# Patient Record
Sex: FEMALE | Race: AMERICAN INDIAN OR ALASKA NATIVE
[De-identification: names, ages, dates, MRNs, and addresses within clinical notes are randomized per-mention and may not be internally consistent; named-entity substitution may affect disease eponyms.]

---

## 2018-03-20 ENCOUNTER — HOSPITAL ENCOUNTER (OUTPATIENT)
Dept: HOSPITAL 5 - US | Age: 33
Discharge: HOME | End: 2018-03-20
Attending: OBSTETRICS & GYNECOLOGY
Payer: MEDICARE

## 2018-03-20 DIAGNOSIS — D25.0: ICD-10-CM

## 2018-03-20 DIAGNOSIS — D25.1: Primary | ICD-10-CM

## 2018-03-20 PROCEDURE — 76856 US EXAM PELVIC COMPLETE: CPT

## 2018-03-20 PROCEDURE — 76830 TRANSVAGINAL US NON-OB: CPT

## 2018-03-20 NOTE — ULTRASOUND REPORT
ULTRASOUND PELVIC COMPLETE

ULTRASOUND TRANSVAGINAL



HISTORY: Pelvic pain.



COMPARISON:  None.



TECHNIQUE: Transabdominal and transvaginal ultrasound with color 

doppler interrogation.



FINDINGS:



Uterus: The uterus is anteverted. The uterus measures 9.1 x 4.4 x 5.8 

cm. At least 3 fibroids are identified. The largest fibroid is 

submucosal in location in the posterior wall measuring 2.6 x 2.0 cm. An 

intramural fibroid in the anterior wall measures 1.4 x 1.1 cm. An 

intramural fibroid at the uterine fundus measures 1.8 x 1.4 cm. The 

cervix is unremarkable.



Endometrium: 7.6 mm. There is mild anterior displacement body posterior 

wall fibroid.



Right ovary: 4.0 x 1.9 x 2.6 cm. No focal abnormality.



Left ovary: 3.3 x 1.4 x 2.2 cm. No focal abnormality.



No pelvic fluid or mass is identified. Normal color doppler 

interrogation.







IMPRESSION:

Uterine fibroid disease as described above.

## 2018-04-24 ENCOUNTER — HOSPITAL ENCOUNTER (OUTPATIENT)
Dept: HOSPITAL 5 - OR | Age: 33
Discharge: HOME | End: 2018-04-24
Attending: OBSTETRICS & GYNECOLOGY
Payer: MEDICARE

## 2018-04-24 VITALS — DIASTOLIC BLOOD PRESSURE: 79 MMHG | SYSTOLIC BLOOD PRESSURE: 115 MMHG

## 2018-04-24 DIAGNOSIS — D25.9: Primary | ICD-10-CM

## 2018-04-24 DIAGNOSIS — N88.2: ICD-10-CM

## 2018-04-24 DIAGNOSIS — J45.909: ICD-10-CM

## 2018-04-24 DIAGNOSIS — F17.200: ICD-10-CM

## 2018-04-24 DIAGNOSIS — I10: ICD-10-CM

## 2018-04-24 DIAGNOSIS — F17.210: ICD-10-CM

## 2018-04-24 LAB
HCT VFR BLD CALC: 39.1 % (ref 30.3–42.9)
HGB BLD-MCNC: 12.7 GM/DL (ref 10.1–14.3)

## 2018-04-24 PROCEDURE — 85018 HEMOGLOBIN: CPT

## 2018-04-24 PROCEDURE — 88305 TISSUE EXAM BY PATHOLOGIST: CPT

## 2018-04-24 PROCEDURE — 81025 URINE PREGNANCY TEST: CPT

## 2018-04-24 PROCEDURE — 58558 HYSTEROSCOPY BIOPSY: CPT

## 2018-04-24 PROCEDURE — 36415 COLL VENOUS BLD VENIPUNCTURE: CPT

## 2018-04-24 PROCEDURE — 85014 HEMATOCRIT: CPT

## 2018-04-24 PROCEDURE — A4217 STERILE WATER/SALINE, 500 ML: HCPCS

## 2018-04-24 NOTE — ANESTHESIA CONSULTATION
Anesthesia Consult and Med Hx


Date of service: 04/24/18





- Airway


Anesthetic Teeth Evaluation: Good


ROM Head & Neck: Adequate


Mallampati Class: Class I





- Pulmonary Exam


CTA: Yes





- Cardiac Exam


Cardiac Exam: RRR





- Pre-Operative Health Status


ASA Pre-Surgery Classification: ASA2


Proposed Anesthetic Plan: General





- Pre-Anesthesia Comment


Pre-Anesthesia Comments: patient smokes 1/2 ppd.  drinks one bottle of wine 

daily.  CHF- no limitations.  takes metoprolol.  Asthma: mild.  last used 

albuterol inhaler 3 months ago





- Pulmonary


Hx Smoking: Yes


Hx Asthma: Yes (last treated 9-10 mos ago)





- Cardiovascular System


Hx Hypertension: Yes (intermittant)





- Central Nervous System


Hx Psychiatric Problems: No





- Other Systems


Hx Alcohol Use: Yes (occas)


Hx Cancer: No

## 2018-04-24 NOTE — OPERATIVE REPORT
Operative Report


Operative Report: 





PREOPERATIVE DIAGNOSIS: 1.  Menorrhagia   2.  Uterine fibroids   3.  Cervical 

stenosis





POSTOPERATIVE DIAGNOSIS: Same





OPERATIVE PROCEDURE:  1.  Hysteroscopy   2.  Dilatation and curettage.





SURGEON:  Gerber Becerra MD





ANESTHESIA: Gen. mask





ANESTHESIOLOGIST: Dr. Brock





ESTIMATED BLOOD LOSS: Less than 10 mL's





FINDINGS: A 10-12 week size uterus with stenotic cervix.  Large amount of lush 

endometrial tissue obtained with D&C.





COMPLICATIONS: None





COUNTS:  Correct x3.





PROCEDURE:  After the patient was correctly identified, and after general 

anesthesia was administered, the patient was prepped and draped in the usual 

sterile fashion and placed in dorsal lithotomy position.  First, the bladder 

was emptied using a straight catheter.  Next, a speculum was placed in the 

vaginal vault and the anterior lip of the cervix was grasped using a single-

tooth tenaculum.  The cervix was stenotic, and the uterus was sounded to 12 cm.

  The cervical os was sequentially dilated, and the hysteroscope was introduced 

into the cervical canal.  Visualization of endometrial cavity found lush amount 

of endometrial tissue,with possible endometrial polyps.  The hysteroscope was 

then removed, sharp curettage was used obtain lush amounts of endometrial 

tissue which was sent to pathology.  After all the endometrial tissue were 

removed, the procedure was considered complete.  All instruments were removed 

from the vagina.  The patient tolerated the procedure well and was transferred 

to the recovery room in stable condition.

## 2018-04-24 NOTE — SHORT STAY SUMMARY
Short Stay Documentation


Date of service: 18


Narrative H&P: 





Pt is a 31yo BF  LMP 17 presents for a Hysteroscopy and D&C.  She was 

unable to tolerate an endometrial biopsy in the office due to cervical stenosis 

and uterine fibroids with thickened endometrium.





- History


Principal diagnosis: Uterine fibroids


H&P: obtained from office


Past Medical History: hypertension, other (Asthma, CHF)


Past Surgical History: , hernia repair, tonsillectomy


Social history: no significant social history, single





- Allergies and Medications


Current Medications: 


 Allergies





amoxicillin [Amoxicillin] Allergy (Verified 18 11:06)


 Rash


codeine Allergy (Verified 18 11:06)


 Angioedema


latex Allergy (Verified 18 11:06)


 Rash





 Home Medications











 Medication  Instructions  Recorded  Confirmed  Last Taken  Type


 


ALBUTEROL Inhaler [Proair] 2 puff IH QID PRN 18 09:00 

History


 


Metoprolol SUCCINATE ER TAB 25 mg PO BID 18 19:00 

History








Active Medications





Gentamicin Sulfate (Garamycin)  120 mg 1.5 mg/kg (120 mg) IV PREOP ALECIA; Protocol


Lactated Ringer's (Lactated Ringers)  1,000 mls @ 100 mls/hr IV AS DIRECT ALECIA


Clindamycin HCl (Cleocin 600 Mg/50 Ml)  600 mg in 50 mls @ 100 mls/hr IV PREOP 

NR; Protocol











- Physical exam


General appearance: no acute distress


Integumentary: no rash


HEENT: Atraumatic


Lungs: Clear to auscultation


Breasts: deferred


Heart: Regular rate


Gastrointestinal: normal


Female Genitourinary: deferred


Extremities: no ischemia, No edema


Neurological: Normal gait, Normal speech





- Brief post op/procedure progress note


Date of procedure: 18


Pre-op diagnosis: 1. Menorrhagia  2. Uterine fibroids  3. Cervical stenosis


Post-op diagnosis: same


Procedure: 





1.  Hysteroscopy  2.  D&C


Anesthesia: MAC


Findings: 





An enlarged uterus with stenotic cervix. Large amounts of lush endometrial 

tissue obtained with D&C.


Surgeon: GLORIA AGUILAR


Estimated blood loss: minimal


Pathology: list (endometrial curettings)


Specimen disposition: to lab


Condition: stable





- Hospital course


Hospital course: 





Unremarkable.





- Disposition


Condition at discharge: Good


Disposition: DC-01 TO HOME OR SELFCARE





- Discharge Diagnoses


(1) Menorrhagia


Status: Chronic   





(2) Uterine fibroid


Status: Chronic   


Qualifiers: 


   Uterine leiomyoma location: intramural and submucous   Qualified Code(s): 

D25.1 - Intramural leiomyoma of uterus; D25.0 - Submucous leiomyoma of uterus   





(3) Cervical stenosis (uterine cervix)


Status: Resolved   





Short Stay Discharge Plan


Activity: no restrictions


Diet: regular


Follow up with: 


JOSELIN AGUILAR MD [Primary Care Provider] - 7 Days


GLORIA AGUILAR MD [Staff Physician] - 14 Days


Prescriptions: 


Ibuprofen [Motrin] 800 mg PO Q8HR PRN #30 tablet


 PRN Reason: Pain, Moderate (4-6)

## 2018-04-29 ENCOUNTER — HOSPITAL ENCOUNTER (EMERGENCY)
Dept: HOSPITAL 5 - ED | Age: 33
LOS: 1 days | Discharge: HOME | End: 2018-04-30
Payer: MEDICARE

## 2018-04-29 DIAGNOSIS — Z88.5: ICD-10-CM

## 2018-04-29 DIAGNOSIS — I10: ICD-10-CM

## 2018-04-29 DIAGNOSIS — D25.1: ICD-10-CM

## 2018-04-29 DIAGNOSIS — I11.0: ICD-10-CM

## 2018-04-29 DIAGNOSIS — K21.9: ICD-10-CM

## 2018-04-29 DIAGNOSIS — F17.200: ICD-10-CM

## 2018-04-29 DIAGNOSIS — I50.9: ICD-10-CM

## 2018-04-29 DIAGNOSIS — J45.909: ICD-10-CM

## 2018-04-29 DIAGNOSIS — R10.32: ICD-10-CM

## 2018-04-29 DIAGNOSIS — Z90.49: ICD-10-CM

## 2018-04-29 DIAGNOSIS — N99.820: Primary | ICD-10-CM

## 2018-04-29 DIAGNOSIS — Z98.890: ICD-10-CM

## 2018-04-29 DIAGNOSIS — Z88.1: ICD-10-CM

## 2018-04-29 DIAGNOSIS — R07.89: ICD-10-CM

## 2018-04-29 DIAGNOSIS — Z91.040: ICD-10-CM

## 2018-04-29 LAB
ALBUMIN SERPL-MCNC: 4.2 G/DL (ref 3.9–5)
ALT SERPL-CCNC: 13 UNITS/L (ref 7–56)
APTT BLD: 30.7 SEC. (ref 24.2–36.6)
BACTERIA #/AREA URNS HPF: (no result) /HPF
BASOPHILS # (AUTO): 0 K/MM3 (ref 0–0.1)
BASOPHILS NFR BLD AUTO: 0.6 % (ref 0–1.8)
BILIRUB UR QL STRIP: (no result)
BLOOD UR QL VISUAL: (no result)
BUN SERPL-MCNC: 7 MG/DL (ref 7–17)
BUN/CREAT SERPL: 14 %
CALCIUM SERPL-MCNC: 9.2 MG/DL (ref 8.4–10.2)
EOSINOPHIL # BLD AUTO: 0.1 K/MM3 (ref 0–0.4)
EOSINOPHIL NFR BLD AUTO: 0.9 % (ref 0–4.3)
HCT VFR BLD CALC: 43 % (ref 30.3–42.9)
HEMOLYSIS INDEX: 4
HGB BLD-MCNC: 13.9 GM/DL (ref 10.1–14.3)
INR PPP: 0.93 (ref 0.87–1.13)
LIPASE SERPL-CCNC: 17 UNITS/L (ref 13–60)
LYMPHOCYTES # BLD AUTO: 2.7 K/MM3 (ref 1.2–5.4)
LYMPHOCYTES NFR BLD AUTO: 47.9 % (ref 13.4–35)
MCH RBC QN AUTO: 26 PG (ref 28–32)
MCHC RBC AUTO-ENTMCNC: 32 % (ref 30–34)
MCV RBC AUTO: 80 FL (ref 79–97)
MONOCYTES # (AUTO): 0.5 K/MM3 (ref 0–0.8)
MONOCYTES % (AUTO): 8.5 % (ref 0–7.3)
MUCOUS THREADS #/AREA URNS HPF: (no result) /HPF
PH UR STRIP: 7 [PH] (ref 5–7)
PLATELET # BLD: 304 K/MM3 (ref 140–440)
PROT UR STRIP-MCNC: (no result) MG/DL
RBC # BLD AUTO: 5.39 M/MM3 (ref 3.65–5.03)
RBC #/AREA URNS HPF: 3 /HPF (ref 0–6)
UROBILINOGEN UR-MCNC: < 2 MG/DL (ref ?–2)
WBC #/AREA URNS HPF: < 1 /HPF (ref 0–6)

## 2018-04-29 PROCEDURE — 83690 ASSAY OF LIPASE: CPT

## 2018-04-29 PROCEDURE — 86900 BLOOD TYPING SEROLOGIC ABO: CPT

## 2018-04-29 PROCEDURE — 93005 ELECTROCARDIOGRAM TRACING: CPT

## 2018-04-29 PROCEDURE — 86901 BLOOD TYPING SEROLOGIC RH(D): CPT

## 2018-04-29 PROCEDURE — 80053 COMPREHEN METABOLIC PANEL: CPT

## 2018-04-29 PROCEDURE — 85730 THROMBOPLASTIN TIME PARTIAL: CPT

## 2018-04-29 PROCEDURE — 96361 HYDRATE IV INFUSION ADD-ON: CPT

## 2018-04-29 PROCEDURE — 96375 TX/PRO/DX INJ NEW DRUG ADDON: CPT

## 2018-04-29 PROCEDURE — 86850 RBC ANTIBODY SCREEN: CPT

## 2018-04-29 PROCEDURE — 93010 ELECTROCARDIOGRAM REPORT: CPT

## 2018-04-29 PROCEDURE — 96374 THER/PROPH/DIAG INJ IV PUSH: CPT

## 2018-04-29 PROCEDURE — 96376 TX/PRO/DX INJ SAME DRUG ADON: CPT

## 2018-04-29 PROCEDURE — 85025 COMPLETE CBC W/AUTO DIFF WBC: CPT

## 2018-04-29 PROCEDURE — 76830 TRANSVAGINAL US NON-OB: CPT

## 2018-04-29 PROCEDURE — 81001 URINALYSIS AUTO W/SCOPE: CPT

## 2018-04-29 PROCEDURE — 74177 CT ABD & PELVIS W/CONTRAST: CPT

## 2018-04-29 PROCEDURE — 99284 EMERGENCY DEPT VISIT MOD MDM: CPT

## 2018-04-29 PROCEDURE — 76856 US EXAM PELVIC COMPLETE: CPT

## 2018-04-29 PROCEDURE — 36415 COLL VENOUS BLD VENIPUNCTURE: CPT

## 2018-04-29 PROCEDURE — 85610 PROTHROMBIN TIME: CPT

## 2018-04-29 RX ADMIN — MORPHINE SULFATE PRN MG: 2 INJECTION, SOLUTION INTRAMUSCULAR; INTRAVENOUS at 19:28

## 2018-04-29 RX ADMIN — MORPHINE SULFATE PRN MG: 2 INJECTION, SOLUTION INTRAMUSCULAR; INTRAVENOUS at 20:26

## 2018-04-29 RX ADMIN — MORPHINE SULFATE PRN MG: 2 INJECTION, SOLUTION INTRAMUSCULAR; INTRAVENOUS at 21:23

## 2018-04-29 RX ADMIN — MORPHINE SULFATE PRN MG: 2 INJECTION, SOLUTION INTRAMUSCULAR; INTRAVENOUS at 18:43

## 2018-04-29 RX ADMIN — MORPHINE SULFATE PRN MG: 2 INJECTION, SOLUTION INTRAMUSCULAR; INTRAVENOUS at 18:01

## 2018-04-29 NOTE — ULTRASOUND REPORT
FINAL REPORT



EXAM:  US TRANSVAGINAL



HISTORY:  abd pain, s/p hysteroscopy 



TECHNIQUE:  Transabdominal and transvaginal sonography of the

pelvis.



PRIORS:  None.



FINDINGS:  

The uterus measures 9.3 x 4.5 x 5.9 cm, has heterogeneous

echotexture and contains hypoechoic fibroids x3, largest noted in

posterior corpus region measuring 3.5 x 2.7 x 3.4 cm. Next

largest fibroid noted in fundal region measuring 1.8 x 1.1 x 1.5

cm. Additional fibroid noted in anterior corpus region measuring

1.3 x 1.1 x 2.1 cm. Small hyperechoic focus and possible

calcification noted in mid posterior corpus subjacent to

endometrium. The endometrial stripe is within normal limits and

measures 6 mm in AP dimension.



The right ovary measures 3.7 x 2.1 x 2.3 cm and is grossly

unremarkable. The left ovary measures 4.0 x 2.2 x 1.8 cm,

demonstrates probable follicular change measuring 2 cm and is

grossly unremarkable. Duplex Doppler shows appropriate blood flow

present in the ovaries bilaterally. No adnexal masses or

significant free peritoneal fluid.



IMPRESSION:  

1. Leiomyomatous change in the uterus. 



2. Findings compatible with follicular change in the left ovary. 



3. Otherwise, unremarkable.

## 2018-04-29 NOTE — CAT SCAN REPORT
FINAL REPORT



EXAM:  CT ABDOMEN PELVIS W CON



HISTORY:  LLQ pain, recent hysteroscopy, stable pelvic US 



TECHNIQUE:  Spiral CT scanning of the abdomen and pelvis after

the uneventful administration of IV contrast. Multiplanar

reformations. 100 mL Omnipaque IV.



PRIORS:  None.



FINDINGS:  

Abdomen: 



Visualized lung bases show mild, dependent atelectasis

bilaterally.  



No radiopaque gallstones. 



Liver without significant abnormality. 



Spleen without significant abnormality. 



Pancreas without significant abnormality. 



Kidneys without significant abnormality. 



Adrenal glands without significant abnormality. 



Pelvis: 



Multiple loops of prominent small bowel and colon containing gas

and fluid without discrete transition point. Mild diverticular

change in the proximal descending colon. 



Appendix within normal limits. 



Trace amount of nonspecific, free fluid in the pelvis. No

discrete abscess. 



Abdominal aorta non-aneurysmal. 



Somewhat lobular uterine margins suggesting leiomyomatous change.

Ovoid, cystic focus in left ovary measuring approximately 2 cm.



IMPRESSION:  

1. Nonspecific bowel gas pattern suggesting adynamic ileus, which

may be associated with nonspecific postinflammatory change,

including diarrhea or enteritis. Correlate clinically. 



2. Possible physiologic cystic change in the left ovary, and

sequelae of ovarian follicle or cyst rupture.

## 2018-04-29 NOTE — EMERGENCY DEPARTMENT REPORT
ED General Adult HPI





- General


Chief complaint: Pain General


Stated complaint: LEFT GROIN SWELLING S/P D&C


Time Seen by Provider: 18 16:32


Source: patient


Mode of arrival: Ambulatory


Limitations: No Limitations





- History of Present Illness


Initial comments: 





32-year-old woman is one-week status post hysteroscopy for resection of uterine 

leiomyomata, and presents today with history of mild recurrent bleeding, but 

significant abdominal pain, which has been progressive since a couple of days 

after surgery, worse in the left lower quadrant.  She has not had any fever or 

chills or diaphoresis, but pain and distention is felt throughout the abdomen, 

and she is having some secondary discomfort in her chest, which is worse with 

breathing, which feels somewhat restricted.





Patient's bleeding initially stopped for a day or so after the initial procedure

, but then again recurring, at about a rate of a pad per day, and has increased 

about 2 pads yesterday.  She has not had any purulence, she has no difficulty 

urinating, and again no fever chills or diaphoresis.


Onset/Timin


-: days(s), week(s)


Location: abdomen, pelvis


Radiation: abdomen


Quality: aching, constant


Consistency: constant


Improves with: other (intermittent partial relief with NSAIDs)


Worsens with: movement


Associated Symptoms: chest pain


Treatments Prior to Arrival: NSAID





- Related Data


 Home Medications











 Medication  Instructions  Recorded  Confirmed  Last Taken


 


ALBUTEROL Inhaler [Proair] 2 puff IH QID PRN 18 09:00


 


Metoprolol SUCCINATE ER TAB 25 mg PO BID 18 19:00








 Previous Rx's











 Medication  Instructions  Recorded  Last Taken  Type


 


Ibuprofen [Motrin] 800 mg PO Q8HR PRN #30 tablet 18 Unknown Rx


 


Ondansetron [Zofran ODT TAB] 8 mg PO Q8HR #10 tab.rapdis 18 Unknown Rx


 


Oxycodone HCl/Acetaminophen 1 each PO Q6HR PRN #20 tablet 18 Unknown Rx





[Percocet 10/325 mg]    











 Allergies











Allergy/AdvReac Type Severity Reaction Status Date / Time


 


amoxicillin [Amoxicillin] Allergy  Rash Verified 18 11:06


 


codeine Allergy  Angioedema Verified 18 11:06


 


latex Allergy  Rash Verified 18 11:06














ED Review of Systems


ROS: 


Stated complaint: LEFT GROIN SWELLING S/P D&C


Other details as noted in HPI





Constitutional: denies: chills, fever


Eyes: denies: eye pain, eye discharge, vision change


ENT: denies: ear pain, throat pain


Respiratory: denies: cough, shortness of breath


Cardiovascular: chest pain (sharp, anterior, costochondral)


Endocrine: no symptoms reported


Gastrointestinal: abdominal pain, nausea.  denies: constipation


Genitourinary: other (vaginal bleeding, one to 2 pads per day).  denies: urgency

, dysuria


Musculoskeletal: back pain


Skin: denies: rash, lesions


Neurological: denies: headache, weakness, paresthesias


Psychiatric: denies: anxiety, depression





ED Past Medical Hx





- Past Medical History


Previous Medical History?: Yes


Hx Hypertension: Yes (intermittant)


Hx Congestive Heart Failure: Yes


Hx GERD: Yes


Hx Asthma: Yes (last treated 9-10 mos ago)


Additional medical history: cardiomyopathy





- Surgical History


Past Surgical History?: Yes


Hx Appendectomy: Yes


Additional Surgical History: c section,  abd hernia repair





- Social History


Smoking Status: Current Every Day Smoker


Substance Use Type: Alcohol, Prescribed





- Medications


Home Medications: 


 Home Medications











 Medication  Instructions  Recorded  Confirmed  Last Taken  Type


 


ALBUTEROL Inhaler [Proair] 2 puff IH QID PRN 18 09:00 

History


 


Ibuprofen [Motrin] 800 mg PO Q8HR PRN #30 tablet 18  Unknown Rx


 


Metoprolol SUCCINATE ER TAB 25 mg PO BID 18 19:00 

History


 


Ondansetron [Zofran ODT TAB] 8 mg PO Q8HR #10 tab.rapdis 18  Unknown Rx


 


Oxycodone HCl/Acetaminophen 1 each PO Q6HR PRN #20 tablet 18  Unknown Rx





[Percocet 10/325 mg]     














ED Physical Exam





- General


Limitations: No Limitations


General appearance: alert, in distress





- Eye


Eye exam: Present: normal appearance, PERRL, EOMI





- ENT


ENT exam: Present: normal exam





- Neck


Neck exam: Present: normal inspection





- Respiratory


Respiratory exam: Present: chest wall tenderness (costochondral tenderness with 

secondary pectoralis muscle, left greater than right)





- Cardiovascular


Cardiovascular Exam: Present: tachycardia





- GI/Abdominal


GI/Abdominal exam: Present: distended (moderate), tenderness (moderate), 

guarding (moderate).  Absent: rebound





- Rectal


Rectal exam: Present: deferred





- Extremities Exam


Extremities exam: Present: normal inspection





- Back Exam


Back exam: Present: tenderness (minimal lumbosacral)





- Neurological Exam


Neurological exam: Present: alert, oriented X3





- Psychiatric


Psychiatric exam: Present: normal affect, normal mood





ED Course


 Vital Signs











  18





  15:56 16:44 16:53


 


Temperature 98.2 F 98.4 F 


 


Pulse Rate 143 H  114 H


 


Respiratory 22 20 10 L





Rate   


 


Blood Pressure 152/92  132/85


 


O2 Sat by Pulse 100 99 100





Oximetry   














  18





  16:55 16:57 16:59


 


Temperature   


 


Pulse Rate 115 H 115 H 114 H


 


Respiratory 15 25 H 18





Rate   


 


Blood Pressure 132/85 132/85 132/85


 


O2 Sat by Pulse 99 99 99





Oximetry   














  18





  17:01 17:02 17:27


 


Temperature   


 


Pulse Rate 112 H  101 H


 


Respiratory 20  18





Rate   


 


Blood Pressure 109/70 109/70 109/70


 


O2 Sat by Pulse 98 98 98





Oximetry   














  18





  17:29 17:30 17:33


 


Temperature   


 


Pulse Rate 103 H 102 H 100 H


 


Respiratory 18 12 14





Rate   


 


Blood Pressure 109/70 142/94 142/94


 


O2 Sat by Pulse 98 98 100





Oximetry   














  18





  17:35 17:37 17:39


 


Temperature   


 


Pulse Rate 107 H 109 H 100 H


 


Respiratory 20 26 H 19





Rate   


 


Blood Pressure 142/94 142/94 142/94


 


O2 Sat by Pulse 100 99 99





Oximetry   














  18





  17:41 17:43 17:45


 


Temperature   


 


Pulse Rate 100 H 101 H 102 H


 


Respiratory 19 21 16





Rate   


 


Blood Pressure 142/94 142/94 142/94


 


O2 Sat by Pulse 99 99 99





Oximetry   














  18





  17:46 17:49 17:51


 


Temperature   


 


Pulse Rate 106 H 104 H 102 H


 


Respiratory 13 17 16





Rate   


 


Blood Pressure 141/75 141/75 141/75


 


O2 Sat by Pulse 99 99 100





Oximetry   














  18





  17:53 17:54 18:25


 


Temperature   


 


Pulse Rate 103 H 105 H 92 H


 


Respiratory 16 13 





Rate   


 


Blood Pressure 141/75 141/75 141/75


 


O2 Sat by Pulse 100 99 





Oximetry   














  18





  18:27 18:29 18:30


 


Temperature   


 


Pulse Rate 89 95 H 95 H


 


Respiratory 22 25 H 24





Rate   


 


Blood Pressure 141/75 141/75 138/83


 


O2 Sat by Pulse 99 98 99





Oximetry   














  18





  18:33 18:35 18:37


 


Temperature   


 


Pulse Rate 94 H 98 H 96 H


 


Respiratory 24 18 17





Rate   


 


Blood Pressure 138/83 138/83 138/83


 


O2 Sat by Pulse 99 99 98





Oximetry   














  18





  18:39 18:41 18:43


 


Temperature   


 


Pulse Rate 94 H 95 H 103 H


 


Respiratory 11 L 14 12





Rate   


 


Blood Pressure 138/83 138/83 138/83


 


O2 Sat by Pulse 97 98 98





Oximetry   














  18





  18:45 18:47 18:49


 


Temperature   


 


Pulse Rate   


 


Respiratory 13 24 18





Rate   


 


Blood Pressure 126/68 126/68 126/68


 


O2 Sat by Pulse 98 97 98





Oximetry   














  18





  18:51 18:53 18:55


 


Temperature   


 


Pulse Rate   


 


Respiratory 13 12 22





Rate   


 


Blood Pressure 126/68 126/68 126/68


 


O2 Sat by Pulse 98 99 96





Oximetry   














  18





  18:57 18:59 19:00


 


Temperature   


 


Pulse Rate   


 


Respiratory 23 21 17





Rate   


 


Blood Pressure 126/68 126/68 125/78


 


O2 Sat by Pulse 97 96 98





Oximetry   














  18





  19:03 19:05 19:07


 


Temperature   


 


Pulse Rate   


 


Respiratory 20 18 13





Rate   


 


Blood Pressure 125/78 125/78 125/78


 


O2 Sat by Pulse 98 98 100





Oximetry   














  18





  19:09 19:11 19:13


 


Temperature   


 


Pulse Rate   


 


Respiratory 20 14 11 L





Rate   


 


Blood Pressure 125/78 125/78 125/78


 


O2 Sat by Pulse 98 97 99





Oximetry   














  18





  19:15 19:17 19:19


 


Temperature   


 


Pulse Rate   


 


Respiratory 14 17 13





Rate   


 


Blood Pressure 134/81 134/81 134/81


 


O2 Sat by Pulse 100 99 99





Oximetry   














  18





  19:21 19:25 19:30


 


Temperature   


 


Pulse Rate   


 


Respiratory 18 9 L 15





Rate   


 


Blood Pressure 134/81 134/81 132/82


 


O2 Sat by Pulse 98 96 100





Oximetry   














  18





  19:35 19:41 19:45


 


Temperature   


 


Pulse Rate   


 


Respiratory 10 L 15 21





Rate   


 


Blood Pressure 132/82 132/82 146/95


 


O2 Sat by Pulse 99 100 99





Oximetry   














  18





  19:51 19:55 20:00


 


Temperature   


 


Pulse Rate   


 


Respiratory 12 12 17





Rate   


 


Blood Pressure 146/95 146/95 151/96


 


O2 Sat by Pulse 100 100 98





Oximetry   














  18





  20:05 20:11 20:15


 


Temperature   


 


Pulse Rate   


 


Respiratory 19 17 13





Rate   


 


Blood Pressure 151/96 151/96 154/101


 


O2 Sat by Pulse 100 99 100





Oximetry   














  18





  20:21 20:25 20:30


 


Temperature   


 


Pulse Rate   


 


Respiratory 10 L 16 17





Rate   


 


Blood Pressure 154/101 154/101 144/102


 


O2 Sat by Pulse 99 99 99





Oximetry   














  18





  20:35 20:41 20:45


 


Temperature   


 


Pulse Rate   


 


Respiratory 13 16 14





Rate   


 


Blood Pressure 144/102 144/102 145/93


 


O2 Sat by Pulse 99 98 100





Oximetry   














  18





  20:51 21:07 21:11


 


Temperature   


 


Pulse Rate   


 


Respiratory 12 10 L 18





Rate   


 


Blood Pressure 145/93 145/93 145/93


 


O2 Sat by Pulse 99 100 98





Oximetry   














  18





  21:15 21:21 21:25


 


Temperature   


 


Pulse Rate   


 


Respiratory 12 18 15





Rate   


 


Blood Pressure 157/88 157/88 157/88


 


O2 Sat by Pulse 100 96 97





Oximetry   














  18





  21:31 21:35 21:41


 


Temperature   


 


Pulse Rate   


 


Respiratory 13 8 L 18





Rate   


 


Blood Pressure 130/72 130/72 130/72


 


O2 Sat by Pulse 99 100 98





Oximetry   














  18





  21:45 21:51 21:55


 


Temperature   


 


Pulse Rate   


 


Respiratory 9 L 17 15





Rate   


 


Blood Pressure 140/71 140/71 140/71


 


O2 Sat by Pulse 99 97 96





Oximetry   














  18





  22:00 22:05 22:11


 


Temperature   


 


Pulse Rate   


 


Respiratory 18 13 16





Rate   


 


Blood Pressure 133/76 133/76 133/76


 


O2 Sat by Pulse 98 99 99





Oximetry   














  18





  22:15 22:21 22:25


 


Temperature   


 


Pulse Rate   


 


Respiratory 24 20 10 L





Rate   


 


Blood Pressure 133/76 133/76 133/76


 


O2 Sat by Pulse 97 99 97





Oximetry   














  18





  22:31 22:35 22:41


 


Temperature   


 


Pulse Rate   


 


Respiratory 15 12 16





Rate   


 


Blood Pressure 133/76 133/76 133/76


 


O2 Sat by Pulse 97 99 95





Oximetry   














  18





  22:45 22:51 22:55


 


Temperature   


 


Pulse Rate   


 


Respiratory 14 16 12





Rate   


 


Blood Pressure 118/82 118/82 118/82


 


O2 Sat by Pulse 98 98 95





Oximetry   














  18





  23:00 23:05 23:09


 


Temperature   


 


Pulse Rate   


 


Respiratory 13 17 11 L





Rate   


 


Blood Pressure 128/76 128/76 128/76


 


O2 Sat by Pulse 99 98 98





Oximetry   














  18





  23:11 23:15 23:21


 


Temperature   


 


Pulse Rate   


 


Respiratory 16 15 13





Rate   


 


Blood Pressure 128/76 128/76 141/84


 


O2 Sat by Pulse 97 98 99





Oximetry   














  18





  23:25 23:30 23:35


 


Temperature   


 


Pulse Rate   


 


Respiratory 17 14 17





Rate   


 


Blood Pressure 141/84 143/80 143/80


 


O2 Sat by Pulse 96 98 99





Oximetry   














  18





  23:41 23:45 23:51


 


Temperature   


 


Pulse Rate   


 


Respiratory 12 16 15





Rate   


 


Blood Pressure 143/80 143/80 129/60


 


O2 Sat by Pulse 98 98 99





Oximetry   














  18





  23:55 00:01 00:05


 


Temperature   


 


Pulse Rate   


 


Respiratory 14 18 14





Rate   


 


Blood Pressure 129/60 121/73 121/73


 


O2 Sat by Pulse 99 97 97





Oximetry   














  18





  00:11 00:15 00:21


 


Temperature   


 


Pulse Rate   


 


Respiratory 15 13 15





Rate   


 


Blood Pressure 121/73 121/73 121/73


 


O2 Sat by Pulse 96 97 99





Oximetry   














  18





  00:25 00:30


 


Temperature  


 


Pulse Rate  


 


Respiratory 14 13





Rate  


 


Blood Pressure 121/73 129/93


 


O2 Sat by Pulse 98 99





Oximetry  














- Reevaluation(s)


Reevaluation #1: 





18 01:54


Patient concerned after multiple evaluations, including pelvic and transvaginal 

ultrasound, lab work surgeon for inflammation, urinalysis, and CT scanning of 

the abdomen and pelvis with IV contrast, none of which showed any major 

pathology, signs of infection, or signs of bleeding.  Patient did have signs of 

perhaps mild adynamic ileus, but was nonobstructive.  Findings were discussed 

with gynecologist on call, who felt that this was relatively benign examination

, and the patient pain can be further evaluated in the office.  Patient is 

concerned about continuing pain, and on repeat examination at this time, I 

still find the patient's abdomen is essentially soft, but she has bilateral 

tenderness across her lower abdomen, worse on the left side than on the right, 

without rebound.  Bowel sounds are active.  Laboratory results and radiology 

results were reviewed, and no additional pathology appears evident.  Patient is 

stable for discharge, will be discharged with pain medication and nausea 

medication, to follow later in the office this morning when the office opens up.





- Consultations


Consultation #1: 





18 00:25


Patient's findings discussed with on-call gynecologist, Dr. Villalpando, after all 

lab results were back and as well as CT scan and urinalysis, fills with benign 

findings, the patient is stable to go home with analgesia, but for early follow-

up with the clinic in the morning, and to call the morning for expedited follow-

up.  Findings discussed with patient, who reports that she drove herself here, 

and will contact a friend to come drive her home, this patient has currently 

been medicated with narcotics.





ED Medical Decision Making





- Lab Data


Result diagrams: 


 18 16:07





 18 16:07





- EKG Data


-: EKG Interpreted by Me (nonspecific tracing, sinus tachycardia, prolonged QT 

interval of 507 ms cor)


EKG shows normal: sinus rhythm


Rate: tachycardia





- EKG Data


When compared to previous EKG there are: previous EKG unavailable





- Radiology Data


Radiology results: report reviewed





CT scan shows adynamic ileus with small nonobstructed fluid-filled bowel loops, 

but no other significant finding.


Pelvic and transvaginal ultrasound again showed pre-existing leiomyomata, but 

no significant blood or fluid accumulation, and no intra-abdominal pelvic 

fluid.  Left ovarian follicular changes were noted .





- Medical Decision Making





Patient has unexplained left lower abdominal pain progressive for the past 5 or 

6 days since she has had hysteroscopy.  Ultrasound examination, laboratory 

evaluation, CT evaluation, and urinalysis are all normal, and source is not 

known.  Since patient is stable, gynecologist feels patient is stable for 

discharge, but recommends early follow-up in the clinic in this coming morning.

  Patient will be discharged home to the care of her friend medicated for 

discomfort, and will be discharged with analgesia prescriptions.





- Differential Diagnosis


persistent vaginal bleeding, intra-abdominal hemorrhage, endometritis


Critical Care Time: No


Critical care attestation.: 


If time is entered above; I have spent that time in minutes in the direct care 

of this critically ill patient, excluding procedure time.








ED Disposition


Clinical Impression: 


 Status post hysteroscopy





Uterine fibroid


Qualifiers:


 Uterine leiomyoma location: intramural and submucous Qualified Code(s): D25.1 

- Intramural leiomyoma of uterus





Abdominal pain


Qualifiers:


 Abdominal location: left lower quadrant Qualified Code(s): R10.32 - Left lower 

quadrant pain





Disposition: DC-01 TO HOME OR SELFCARE


Is pt being admited?: No


Does the pt Need Aspirin: No


Condition: Stable


Instructions:  Abdominal Pain (ED)


Additional Instructions: 


We have spoken with on-call gynecologist tonight, and he recommends discharge 

home tonight, with pain medication, and we have given a dose of medicine before 

your discharge.  Their recommendation is to follow-up with Dr. Becerra in the 

office this coming morning when the office opens up.  Give the office a call 

first thing, told him that you were seen in the emergency department, and that 

arrangements should be made for you to be seen by your doctor today.


We have given a prescription for medicine for additional pain, wanted to rest 

quietly, drink plenty of fluids, and he may also take additional medication for 

nausea as well.


Prescriptions: 


Ondansetron [Zofran ODT TAB] 8 mg PO Q8HR #10 tab.rapdis


Oxycodone HCl/Acetaminophen [Percocet 10/325 mg] 1 each PO Q6HR PRN #20 tablet


 PRN Reason: Pain


Referrals: 


JOSELIN BECERRA MD [Primary Care Provider] - 3-5 Days


GLORIA BECERRA MD [Staff Physician] - 3-5 Days


Time of Disposition: 00:15

## 2018-04-29 NOTE — ULTRASOUND REPORT
FINAL REPORT



EXAM:  US PELVIC COMPLETE



HISTORY:  abd pain, s/p hysteroscopy 



TECHNIQUE:  Transabdominal and transvaginal sonography of the

pelvis. Duplex Doppler performed. 



PRIORS:  None.



FINDINGS:  

The uterus measures 9.3 x 4.5 x 5.9 cm, has heterogeneous

echotexture and contains hypoechoic fibroids x3, largest noted in

posterior corpus region measuring 3.5 x 2.7 x 3.4 cm. Next

largest fibroid noted in fundal region measuring 1.8 x 1.1 x 1.5

cm. Additional fibroid noted in anterior corpus region measuring

1.3 x 1.1 x 2.1 cm. Small hyperechoic focus and possible

calcification noted in mid posterior corpus subjacent to

endometrium. The endometrial stripe is within normal limits and

measures 6 mm in AP dimension.



The right ovary measures 3.7 x 2.1 x 2.3 cm and is grossly

unremarkable. The left ovary measures 4.0 x 2.2 x 1.8 cm,

demonstrates probable follicular change measuring 2 cm and is

grossly unremarkable. Duplex Doppler shows appropriate blood flow

present in the ovaries bilaterally. No adnexal masses or

significant free peritoneal fluid.



IMPRESSION:  

1. Leiomyomatous change in the uterus. 



2. Findings compatible with follicular change in the left ovary.



3. Otherwise, unremarkable.

## 2018-04-30 VITALS — DIASTOLIC BLOOD PRESSURE: 90 MMHG | SYSTOLIC BLOOD PRESSURE: 133 MMHG

## 2018-04-30 RX ADMIN — MORPHINE SULFATE PRN MG: 2 INJECTION, SOLUTION INTRAMUSCULAR; INTRAVENOUS at 01:31

## 2018-06-20 ENCOUNTER — HOSPITAL ENCOUNTER (EMERGENCY)
Dept: HOSPITAL 5 - ED | Age: 33
Discharge: LEFT BEFORE DISCHARGE | End: 2018-06-20
Payer: MEDICARE

## 2018-06-20 VITALS — SYSTOLIC BLOOD PRESSURE: 128 MMHG | DIASTOLIC BLOOD PRESSURE: 71 MMHG

## 2018-06-20 DIAGNOSIS — Z88.5: ICD-10-CM

## 2018-06-20 DIAGNOSIS — F17.200: ICD-10-CM

## 2018-06-20 DIAGNOSIS — Z53.21: ICD-10-CM

## 2018-06-20 DIAGNOSIS — J45.909: ICD-10-CM

## 2018-06-20 DIAGNOSIS — Z88.1: ICD-10-CM

## 2018-06-20 DIAGNOSIS — Z90.49: ICD-10-CM

## 2018-06-20 DIAGNOSIS — K21.9: ICD-10-CM

## 2018-06-20 DIAGNOSIS — Z3A.01: ICD-10-CM

## 2018-06-20 DIAGNOSIS — R10.9: ICD-10-CM

## 2018-06-20 DIAGNOSIS — I10: ICD-10-CM

## 2018-06-20 DIAGNOSIS — Z91.040: ICD-10-CM

## 2018-06-20 DIAGNOSIS — O26.891: Primary | ICD-10-CM

## 2018-06-20 LAB
ALBUMIN SERPL-MCNC: 3.9 G/DL (ref 3.9–5)
ALT SERPL-CCNC: 11 UNITS/L (ref 7–56)
BASOPHILS # (AUTO): 0.1 K/MM3 (ref 0–0.1)
BASOPHILS NFR BLD AUTO: 1.2 % (ref 0–1.8)
BILIRUB UR QL STRIP: (no result)
BLOOD UR QL VISUAL: (no result)
BUN SERPL-MCNC: 11 MG/DL (ref 7–17)
BUN/CREAT SERPL: 22 %
CALCIUM SERPL-MCNC: 8.5 MG/DL (ref 8.4–10.2)
EOSINOPHIL # BLD AUTO: 0.1 K/MM3 (ref 0–0.4)
EOSINOPHIL NFR BLD AUTO: 2.4 % (ref 0–4.3)
HCT VFR BLD CALC: 37.6 % (ref 30.3–42.9)
HEMOLYSIS INDEX: 3
HGB BLD-MCNC: 12 GM/DL (ref 10.1–14.3)
LYMPHOCYTES # BLD AUTO: 2.1 K/MM3 (ref 1.2–5.4)
LYMPHOCYTES NFR BLD AUTO: 44.3 % (ref 13.4–35)
MCH RBC QN AUTO: 26 PG (ref 28–32)
MCHC RBC AUTO-ENTMCNC: 32 % (ref 30–34)
MCV RBC AUTO: 81 FL (ref 79–97)
MONOCYTES # (AUTO): 0.6 K/MM3 (ref 0–0.8)
MONOCYTES % (AUTO): 12.4 % (ref 0–7.3)
MUCOUS THREADS #/AREA URNS HPF: (no result) /HPF
PH UR STRIP: 5 [PH] (ref 5–7)
PLATELET # BLD: 271 K/MM3 (ref 140–440)
PROT UR STRIP-MCNC: (no result) MG/DL
RBC # BLD AUTO: 4.62 M/MM3 (ref 3.65–5.03)
RBC #/AREA URNS HPF: 4 /HPF (ref 0–6)
UROBILINOGEN UR-MCNC: < 2 MG/DL (ref ?–2)
WBC #/AREA URNS HPF: 1 /HPF (ref 0–6)

## 2018-06-20 PROCEDURE — 80053 COMPREHEN METABOLIC PANEL: CPT

## 2018-06-20 PROCEDURE — 81025 URINE PREGNANCY TEST: CPT

## 2018-06-20 PROCEDURE — 81001 URINALYSIS AUTO W/SCOPE: CPT

## 2018-06-20 PROCEDURE — 36415 COLL VENOUS BLD VENIPUNCTURE: CPT

## 2018-06-20 PROCEDURE — 85025 COMPLETE CBC W/AUTO DIFF WBC: CPT

## 2018-08-31 ENCOUNTER — HOSPITAL ENCOUNTER (EMERGENCY)
Dept: HOSPITAL 5 - ED | Age: 33
End: 2018-08-31
Payer: MEDICARE

## 2018-08-31 VITALS — SYSTOLIC BLOOD PRESSURE: 113 MMHG | DIASTOLIC BLOOD PRESSURE: 50 MMHG

## 2018-08-31 DIAGNOSIS — Z53.21: ICD-10-CM

## 2018-08-31 DIAGNOSIS — R10.2: Primary | ICD-10-CM

## 2018-08-31 LAB
BILIRUB UR QL STRIP: (no result)
BLOOD UR QL VISUAL: (no result)
MUCOUS THREADS #/AREA URNS HPF: (no result) /HPF
PH UR STRIP: 6 [PH] (ref 5–7)
PROT UR STRIP-MCNC: (no result) MG/DL
RBC #/AREA URNS HPF: 6 /HPF (ref 0–6)
UROBILINOGEN UR-MCNC: < 2 MG/DL (ref ?–2)
WBC #/AREA URNS HPF: 1 /HPF (ref 0–6)

## 2018-08-31 PROCEDURE — 81001 URINALYSIS AUTO W/SCOPE: CPT

## 2018-08-31 PROCEDURE — 81025 URINE PREGNANCY TEST: CPT

## 2018-12-30 ENCOUNTER — HOSPITAL ENCOUNTER (OUTPATIENT)
Dept: HOSPITAL 5 - TRG | Age: 33
Discharge: HOME | End: 2018-12-30
Attending: OBSTETRICS & GYNECOLOGY
Payer: MEDICARE

## 2018-12-30 VITALS — DIASTOLIC BLOOD PRESSURE: 67 MMHG | SYSTOLIC BLOOD PRESSURE: 139 MMHG

## 2018-12-30 DIAGNOSIS — R10.9: ICD-10-CM

## 2018-12-30 DIAGNOSIS — Z3A.33: ICD-10-CM

## 2018-12-30 DIAGNOSIS — Z90.49: ICD-10-CM

## 2018-12-30 DIAGNOSIS — R07.9: ICD-10-CM

## 2018-12-30 DIAGNOSIS — Z90.89: ICD-10-CM

## 2018-12-30 DIAGNOSIS — O99.513: ICD-10-CM

## 2018-12-30 DIAGNOSIS — J45.909: ICD-10-CM

## 2018-12-30 DIAGNOSIS — Z87.891: ICD-10-CM

## 2018-12-30 DIAGNOSIS — O26.893: Primary | ICD-10-CM

## 2018-12-30 LAB
BACTERIA #/AREA URNS HPF: (no result) /HPF
BILIRUB UR QL STRIP: (no result)
BLOOD UR QL VISUAL: (no result)
MUCOUS THREADS #/AREA URNS HPF: (no result) /HPF
PH UR STRIP: 6 [PH] (ref 5–7)
PROT UR STRIP-MCNC: (no result) MG/DL
RBC #/AREA URNS HPF: 10 /HPF (ref 0–6)
UROBILINOGEN UR-MCNC: < 2 MG/DL (ref ?–2)
WBC #/AREA URNS HPF: 1 /HPF (ref 0–6)

## 2018-12-30 PROCEDURE — 87086 URINE CULTURE/COLONY COUNT: CPT

## 2018-12-30 PROCEDURE — 81001 URINALYSIS AUTO W/SCOPE: CPT

## 2019-01-31 ENCOUNTER — HOSPITAL ENCOUNTER (INPATIENT)
Dept: HOSPITAL 5 - APU | Age: 34
LOS: 6 days | Discharge: HOME | End: 2019-02-06
Attending: OBSTETRICS & GYNECOLOGY | Admitting: OBSTETRICS & GYNECOLOGY
Payer: MEDICARE

## 2019-01-31 DIAGNOSIS — F17.200: ICD-10-CM

## 2019-01-31 DIAGNOSIS — I11.0: ICD-10-CM

## 2019-01-31 DIAGNOSIS — K21.9: ICD-10-CM

## 2019-01-31 DIAGNOSIS — Z90.89: ICD-10-CM

## 2019-01-31 DIAGNOSIS — Z88.5: ICD-10-CM

## 2019-01-31 DIAGNOSIS — Z82.49: ICD-10-CM

## 2019-01-31 DIAGNOSIS — Z3A.38: ICD-10-CM

## 2019-01-31 DIAGNOSIS — J45.909: ICD-10-CM

## 2019-01-31 DIAGNOSIS — I42.0: ICD-10-CM

## 2019-01-31 DIAGNOSIS — D25.0: ICD-10-CM

## 2019-01-31 DIAGNOSIS — D50.9: ICD-10-CM

## 2019-01-31 DIAGNOSIS — O34.13: ICD-10-CM

## 2019-01-31 DIAGNOSIS — Z91.040: ICD-10-CM

## 2019-01-31 DIAGNOSIS — E66.01: ICD-10-CM

## 2019-01-31 DIAGNOSIS — D25.1: ICD-10-CM

## 2019-01-31 DIAGNOSIS — I50.9: ICD-10-CM

## 2019-01-31 DIAGNOSIS — Z23: ICD-10-CM

## 2019-01-31 DIAGNOSIS — O34.211: Primary | ICD-10-CM

## 2019-01-31 DIAGNOSIS — Z88.1: ICD-10-CM

## 2019-01-31 LAB
BASOPHILS # (AUTO): 0 K/MM3 (ref 0–0.1)
BASOPHILS NFR BLD AUTO: 0.3 % (ref 0–1.8)
EOSINOPHIL # BLD AUTO: 0.1 K/MM3 (ref 0–0.4)
EOSINOPHIL NFR BLD AUTO: 0.8 % (ref 0–4.3)
HCT VFR BLD CALC: 35 % (ref 30.3–42.9)
HGB BLD-MCNC: 10.9 GM/DL (ref 10.1–14.3)
LYMPHOCYTES # BLD AUTO: 1.5 K/MM3 (ref 1.2–5.4)
LYMPHOCYTES NFR BLD AUTO: 20.4 % (ref 13.4–35)
MCHC RBC AUTO-ENTMCNC: 31 % (ref 30–34)
MCV RBC AUTO: 77 FL (ref 79–97)
MONOCYTES # (AUTO): 0.8 K/MM3 (ref 0–0.8)
MONOCYTES % (AUTO): 11.5 % (ref 0–7.3)
PLATELET # BLD: 265 K/MM3 (ref 140–440)
RBC # BLD AUTO: 4.58 M/MM3 (ref 3.65–5.03)

## 2019-01-31 PROCEDURE — 59025 FETAL NON-STRESS TEST: CPT

## 2019-01-31 PROCEDURE — 86900 BLOOD TYPING SEROLOGIC ABO: CPT

## 2019-01-31 PROCEDURE — 86706 HEP B SURFACE ANTIBODY: CPT

## 2019-01-31 PROCEDURE — 93306 TTE W/DOPPLER COMPLETE: CPT

## 2019-01-31 PROCEDURE — 36415 COLL VENOUS BLD VENIPUNCTURE: CPT

## 2019-01-31 PROCEDURE — 85018 HEMOGLOBIN: CPT

## 2019-01-31 PROCEDURE — 80053 COMPREHEN METABOLIC PANEL: CPT

## 2019-01-31 PROCEDURE — 85025 COMPLETE CBC W/AUTO DIFF WBC: CPT

## 2019-01-31 PROCEDURE — C9250 ARTISS FIBRIN SEALANT: HCPCS

## 2019-01-31 PROCEDURE — 96375 TX/PRO/DX INJ NEW DRUG ADDON: CPT

## 2019-01-31 PROCEDURE — 96361 HYDRATE IV INFUSION ADD-ON: CPT

## 2019-01-31 PROCEDURE — A6250 SKIN SEAL PROTECT MOISTURIZR: HCPCS

## 2019-01-31 PROCEDURE — 85014 HEMATOCRIT: CPT

## 2019-01-31 PROCEDURE — 96360 HYDRATION IV INFUSION INIT: CPT

## 2019-01-31 PROCEDURE — 96374 THER/PROPH/DIAG INJ IV PUSH: CPT

## 2019-01-31 PROCEDURE — 86901 BLOOD TYPING SEROLOGIC RH(D): CPT

## 2019-01-31 PROCEDURE — 86850 RBC ANTIBODY SCREEN: CPT

## 2019-01-31 RX ADMIN — HYDROMORPHONE HYDROCHLORIDE PRN MG: 1 INJECTION, SOLUTION INTRAMUSCULAR; INTRAVENOUS; SUBCUTANEOUS at 14:48

## 2019-01-31 RX ADMIN — CLINDAMYCIN IN 5 PERCENT DEXTROSE SCH MLS/HR: 12 INJECTION, SOLUTION INTRAVENOUS at 23:34

## 2019-01-31 RX ADMIN — SODIUM CHLORIDE, SODIUM LACTATE, POTASSIUM CHLORIDE, AND CALCIUM CHLORIDE SCH MLS/HR: .6; .31; .03; .02 INJECTION, SOLUTION INTRAVENOUS at 12:12

## 2019-01-31 RX ADMIN — HYDROMORPHONE HYDROCHLORIDE PRN MG: 1 INJECTION, SOLUTION INTRAMUSCULAR; INTRAVENOUS; SUBCUTANEOUS at 14:50

## 2019-01-31 RX ADMIN — SODIUM CHLORIDE, SODIUM LACTATE, POTASSIUM CHLORIDE, AND CALCIUM CHLORIDE SCH MLS/HR: .6; .31; .03; .02 INJECTION, SOLUTION INTRAVENOUS at 11:42

## 2019-01-31 RX ADMIN — HYDROMORPHONE HYDROCHLORIDE PRN MG: 1 INJECTION, SOLUTION INTRAMUSCULAR; INTRAVENOUS; SUBCUTANEOUS at 15:24

## 2019-01-31 RX ADMIN — HYDROMORPHONE HYDROCHLORIDE PRN MG: 1 INJECTION, SOLUTION INTRAMUSCULAR; INTRAVENOUS; SUBCUTANEOUS at 15:00

## 2019-01-31 RX ADMIN — KETOROLAC TROMETHAMINE PRN MG: 30 INJECTION, SOLUTION INTRAMUSCULAR at 19:38

## 2019-01-31 RX ADMIN — HYDROMORPHONE HYDROCHLORIDE PRN MG: 1 INJECTION, SOLUTION INTRAMUSCULAR; INTRAVENOUS; SUBCUTANEOUS at 15:09

## 2019-01-31 NOTE — ANESTHESIA CONSULTATION
Anesthesia Consult and Med Hx


Date of service: 01/31/19





- Airway


Anesthetic Teeth Evaluation: Good


ROM Head & Neck: Adequate


Mental/Hyoid Distance: Adequate


Mallampati Class: Class III


Intubation Access Assessment: Good





- Pulmonary Exam


CTA: Yes





- Cardiac Exam


Cardiac Exam: No Murmur


Anesthetic Concerns: ASA II hostory of CHF, HTN and Asthma





- Pre-Operative Health Status


ASA Pre-Surgery Classification: ASA3


Proposed Anesthetic Plan: Spinal





- Pulmonary


Hx Smoking: Yes


Hx Asthma: Yes (pt used neb machine earlier today)


SOB: No


COPD: No


Home Oxygen Therapy: No


Hx Pneumonia: No


Hx Sleep Apnea: No





- Cardiovascular System


Hx Hypertension: No ( and CHF )


Hx Coronary Artery Disease: No


Hx Heart Attack/AMI: No


Hx Percutaneous Transluminal Coronary Angioplasty (PTCA): No


Hx Cardia Arrhythmia: No


Hx Internal Defibrillator: No


Hx Valvular Heart Disease: No


Hx Heart Murmur: No


Hx Peripheral Vascular Disease: No





- Central Nervous System


Hx Seizures: No


Hx Psychiatric Problems: No





- Endocrine


Hx Renal Disease: No


Hx End Stage Renal Disease: No


Hx Cirrhosis: No


Hx Liver Disease: No


Hx Insulin Dependent Diabetes: No


Hx Non-Insulin Dependent Diabetes: No


Hx Thyroid Disease: No


Hx Hypothyroidism: No


Hx Hyperthyroidism: No





- Hematic


Hx Anemia: No


Hx Sickle Cell Disease: No





- Other Systems


Hx Alcohol Use: No


Hx Substance Use: No


Hx Cancer: No


Hx Obesity: No

## 2019-01-31 NOTE — ANESTHESIA CONSULTATION
Anesthesia Consult and Med Hx





- Airway


Anesthetic Teeth Evaluation: Good


ROM Head & Neck: Adequate


Mental/Hyoid Distance: Adequate


Mallampati Class: Class III


Intubation Access Assessment: Probably Good





- Pulmonary Exam


CTA: Yes





- Cardiac Exam


Cardiac Exam: No Murmur





- Pre-Operative Health Status


ASA Pre-Surgery Classification: ASA3


Proposed Anesthetic Plan: Spinal





- Pulmonary


Hx Smoking: Yes


Hx Asthma: Yes (pt used neb machine earlier today)





- Cardiovascular System


Hx Hypertension: No





- Central Nervous System


Hx Seizures: No


Hx Psychiatric Problems: No





- Endocrine


Hx Renal Disease: No


Hx Hypothyroidism: No


Hx Hyperthyroidism: No





- Hematic


Hx Anemia: No


Hx Sickle Cell Disease: No





- Other Systems


Hx Alcohol Use: No


Hx Cancer: No

## 2019-01-31 NOTE — HISTORY AND PHYSICAL REPORT
History of Present Illness


Date of examination: 19


Date of admission: 


19 09:57





Chief complaint: 





Scheduled C Section


History of present illness: 





Pt is a 34yo BF  EDC 19;  EGA 38 0/7 weeks presents for Repeat C 

Section per [facility] due to CHF.  She received prenatal care at [facility] since 8 weeks and co-managed by [facility] for  Previous C Section, Morbid 

Obesity and CHF (followed by Cardiology).  Prenatal records are available and 

GBS is Negative.





Past History


Past Medical History: asthma, heart disease (CHF), hypertension, GERD


Past Surgical History: tonsillectomy,  section, other (hernia repair)


Family/Genetic History: heart disease, hypertension, stroke


Social history: no significant social history, single





- Obstetrical History


Expected Date of Delivery: 19


Actual Gestation: 38 Week(s) 0 Day(s) 


: 2





Medications and Allergies


                                    Allergies











Allergy/AdvReac Type Severity Reaction Status Date / Time


 


amoxicillin [Amoxicillin] Allergy  Rash Verified 18 10:16


 


codeine Allergy  Angioedema Verified 18 10:16


 


latex Allergy  Rash Verified 18 10:16











                                Home Medications











 Medication  Instructions  Recorded  Confirmed  Last Taken  Type


 


Metoprolol SUCCINATE ER TAB 25 mg PO BID 18 09:00 

History





    1 


 


Ferrous Sulfate [Feosol 325 MG tab] 325 mg PO BID #60 tablet 19  Unknown 

Rx


 


HYDROcodone/APAP 5-325 [[place] 1 each PO Q6HR PRN #30 tablet 19  Unknown Rx





5/325]     


 


Ibuprofen [Motrin] 800 mg PO Q8HR PRN #30 tablet 19  Unknown Rx


 


Pnv No.95/Ferrous Fum/Folic AC 1 each PO DAILY #30 tablet 19  Unknown Rx





[Prenavite Tablet]     


 


Prenatal Vit-Fe Fumar-FA [Prenatal 1 tab PO QDAY 19 

23:00 History





Vitamin]     














Review of Systems


All systems: negative





- Physical Exam


Breasts: Positive: deferred


Cardiovascular: Regular rate


Lungs: Positive: Clear to auscultation


Abdomen: Positive: normal appearance


Genitourinary (Female): Positive: normal external genitalia


Uterus: Positive: enlarged


Extremities: Positive: normal





- Obstetrical


FHR: category 1


Uterine Contraction Monitor Mode: External


Uterine Contraction Pattern: Absent





Results


Result Diagrams: 


                                 19 11:30





All other labs normal.








Assessment and Plan





- Patient Problems


(1) 38 weeks gestation of pregnancy


Onset Date: 19   Current Visit: Yes   Status: Acute   





(2) CHF (congestive heart failure)


Onset Date: 19   Current Visit: Yes   Status: Acute   


Qualifiers: 


   Heart failure chronicity: chronic 





(3) Chronic hypertension


Onset Date: 19   Current Visit: Yes   Status: Chronic   





(4) Uterine fibroid in pregnancy


Onset Date: 19   Current Visit: Yes   Status: Chronic

## 2019-01-31 NOTE — OPERATIVE REPORT
Operative Report


Operative Report: 


Date of procedure: 2019





Pre-operative diagnosis: 1.  Intrauterine pregnancy at 38-0/7 weeks   2.  Previo

us    3.  Uterine fibroids   4.  Congestive heart failure   5.  Chronic

hypertension





Post-operative diagnosis: Same





Procedure name(s): Repeat low transverse  section





Surgeon: Gerber Becerra MD





Assistant: None





Anesthesia: Spinal anesthesia by Dr. Le





EBL: 500 mls





Findings: A 2895 g female infant Apgars 8 at 1 minute and 9 at 5 minutes.  Clear

amniotic fluid.  Fibroid uterus.  Normal tubes and ovaries bilaterally.





Procedure: After the patient was prepped and draped in usual sterile fashion, 

and after satisfactory level of epidural anesthesia was obtained, the skin knife

was used to make a transverse skin incision through the previous skin scar.  The

incision was excised down to layer of the fascia, which was nicked in the 

midline and extended laterally using the Bovie cautery.  The rectus muscles were

dissected off the rectus fascia both superiorly and inferiorly.  The rectus 

bellies  in the midline, and the peritoneum was entered under direct 

visualization.  The peritoneal incision was extended superiorly and inferiorly. 

A bladder flap was created and the bladder blade was then placed.  The uterus 

was scored in a curvilinear linear fashion, entered in the midline revealing 

clear amniotic fluid.  The infant's head was delivered onto the surgical field 

with aid of a vacuum, and the oropharynx and nasopharynx were bulb suctioned.  

Nuchal cord 1 easily reduced.  The rest of the infant's body was delivered, 

cord was doubly clamped and cut and the infant was handed to the waiting 

respiratory team.  Cord blood was then obtained.  The placenta was manually 

removed from the uterus, but the uterus could not be removed from its normal 

anatomical position.  After gentle uterine lavage, the incision was inspected 

and found to be without extensions.  It was then closed in 2 layers using 0 

Vicryl suture in a running interlocking fashion, the second layer imbricating 

the first.  After good hemostasis was achieved, copious amounts or irrigation 

was performed, and the gutters were suctioned free of blood and blood clots.  

The Tisseel sealant was sprayed across the uterine incision.  After excellent 

hemostasis assured, the peritoneum was re-approximated using 3-0 Vicryl suture 

in a running interlocking fashion, and then the rectus muscles were re-

approximated using 3-0 Vicryl suture in a figure-of-eight configuration.  The 

fascia was then re-approximated using 0 Vicryl suture in running interlocking 

fashion.  The subcutaneous layer was made hemostatic using Bovie cautery, the 

Tisseel sealant was sprayed across the fascial incision and the skin edges re-

approximated using 4-0 Vicryl suture in a sub-cuticular fashion.  Patient 

tolerated the procedure well was transported to recovery in stable condition.

## 2019-02-01 LAB
HCT VFR BLD CALC: 31.2 % (ref 30.3–42.9)
HGB BLD-MCNC: 9.9 GM/DL (ref 10.1–14.3)

## 2019-02-01 PROCEDURE — 3E0234Z INTRODUCTION OF SERUM, TOXOID AND VACCINE INTO MUSCLE, PERCUTANEOUS APPROACH: ICD-10-PCS | Performed by: OBSTETRICS & GYNECOLOGY

## 2019-02-01 RX ADMIN — KETOROLAC TROMETHAMINE PRN MG: 30 INJECTION, SOLUTION INTRAMUSCULAR at 01:43

## 2019-02-01 RX ADMIN — IBUPROFEN PRN MG: 800 TABLET, FILM COATED ORAL at 23:54

## 2019-02-01 RX ADMIN — IBUPROFEN PRN MG: 800 TABLET, FILM COATED ORAL at 17:32

## 2019-02-01 RX ADMIN — HYDROCODONE BITARTRATE AND ACETAMINOPHEN PRN EACH: 5; 325 TABLET ORAL at 15:10

## 2019-02-01 RX ADMIN — SIMETHICONE PRN MG: 80 TABLET, CHEWABLE ORAL at 23:53

## 2019-02-01 RX ADMIN — SIMETHICONE PRN MG: 80 TABLET, CHEWABLE ORAL at 17:35

## 2019-02-01 RX ADMIN — POTASSIUM CHLORIDE SCH MEQ: 1500 TABLET, EXTENDED RELEASE ORAL at 10:03

## 2019-02-01 RX ADMIN — OXYCODONE AND ACETAMINOPHEN PRN TAB: 5; 325 TABLET ORAL at 10:02

## 2019-02-01 RX ADMIN — METOPROLOL SUCCINATE SCH MG: 25 TABLET, FILM COATED, EXTENDED RELEASE ORAL at 22:00

## 2019-02-01 RX ADMIN — METOPROLOL SUCCINATE SCH MG: 25 TABLET, FILM COATED, EXTENDED RELEASE ORAL at 11:16

## 2019-02-01 RX ADMIN — Medication SCH EACH: at 10:04

## 2019-02-01 RX ADMIN — FERROUS SULFATE TAB 325 MG (65 MG ELEMENTAL FE) SCH MG: 325 (65 FE) TAB at 10:02

## 2019-02-01 RX ADMIN — HYDROCODONE BITARTRATE AND ACETAMINOPHEN PRN EACH: 5; 325 TABLET ORAL at 22:57

## 2019-02-01 RX ADMIN — CLINDAMYCIN IN 5 PERCENT DEXTROSE SCH MLS/HR: 12 INJECTION, SOLUTION INTRAVENOUS at 08:46

## 2019-02-01 NOTE — PROGRESS NOTE
Assessment and Plan





(1) CHF (congestive heart failure)


Onset Date: 01/31/19   Current Visit: Yes   Status: Chronic   


Qualifiers: 


   Heart failure chronicity: chronic 


Plan to address problem: 


By History


s/p One dose of Lasix for Pedal edema


ECHO for EF - pending


Patient not on diuretics for 2 years











(2) HTN (hypertension)


Current Visit: Yes   Status: Chronic   


Qualifiers: 


   Hypertension type: essential hypertension   Qualified Code(s): I10 - 

Essential (primary) hypertension   


Plan to address problem: 


Cont Metoprolol








(3) Anemia


Current Visit: Yes   Status: Chronic   


Qualifiers: 


   Anemia type: iron deficiency 


Plan to address problem: 


Cont Ferrous sulfate








(4) DVT prophylaxis


Current Visit: Yes   Status: Acute    


On SCD's





Subjective


Date of service: 02/01/19


Principal diagnosis: s/p C Section - POD #1


Interval history: 





pt seen and examined


 denies any chest pain or SOB


tolerating diet











Objective





- Constitutional


Vitals: 


                               Vital Signs - 12hr











  02/01/19 02/01/19 02/01/19





  05:24 08:24 11:16


 


Temperature 98.3 F 99.2 F 


 


Pulse Rate 100 H 106 H 106 H


 


Respiratory 30 H 20 





Rate   


 


Blood Pressure 140/92 141/71 141/71


 


O2 Sat by Pulse 98 97 





Oximetry   











General appearance: Present: no acute distress, well-nourished





- EENT


Eyes: PERRL, EOM intact


ENT: hearing intact, clear oral mucosa


Ears: bilateral: normal





- Neck


Neck: supple, normal ROM





- Respiratory


Respiratory effort: normal


Respiratory: bilateral: CTA





- Cardiovascular


Rhythm: regular


Heart Sounds: Present: S1 & S2.  Absent: gallop, rub


Extremities: pulses intact, No edema, normal color, Full ROM





- Gastrointestinal


General gastrointestinal: Present: soft, non-tender, non-distended, normal bowel

sounds





- Integumentary


Integumentary: clear, warm, dry





- Musculoskeletal


Musculoskeletal: 1, strength equal bilaterally





- Neurologic


Neurologic: moves all extremities





- Psychiatric


Psychiatric: memory intact, appropriate mood/affect, intact judgment & insight





- Labs


CBC & Chem 7: 


                                 02/01/19 01:40





Labs: 


                              Abnormal lab results











  02/01/19 Range/Units





  01:40 


 


Hgb  9.9 L  (10.1-14.3)  gm/dl

## 2019-02-01 NOTE — CONSULTATION
History of Present Illness





- Reason for Consult


Consult date: 19


CHF


Requesting physician: GLORIA AGUILAR





- History of Present Illness





34 y/o AAF being evaluated for CHF.


Patient has history of CHF and was on Lasix which she stopped 2 years 

ago.Patient had Csection delivery today.No SOB or Orthopnea.Complains of legs 

and hands being swollen.No SOB.No chest pain





Past History


Past Medical History: heart failure, hypertension


Past Surgical History: 


Social history: no significant social history, single, full code


Family history: hypertension





Medications and Allergies


                                    Allergies











Allergy/AdvReac Type Severity Reaction Status Date / Time


 


amoxicillin [Amoxicillin] Allergy  Rash Verified 18 10:16


 


codeine Allergy  Angioedema Verified 18 10:16


 


latex Allergy  Rash Verified 18 10:16











                                Home Medications











 Medication  Instructions  Recorded  Confirmed  Last Taken  Type


 


Metoprolol SUCCINATE ER TAB 25 mg PO BID 18 09:00 

History





    1 


 


Ferrous Sulfate [Feosol 325 MG tab] 325 mg PO BID #60 tablet 19  Unknown 

Rx


 


HYDROcodone/APAP 5-325 [[place] 1 each PO Q6HR PRN #30 tablet 19  Unknown Rx





5/325]     


 


Ibuprofen [Motrin] 800 mg PO Q8HR PRN #30 tablet 19  Unknown Rx


 


Pnv No.95/Ferrous Fum/Folic AC 1 each PO DAILY #30 tablet 19  Unknown Rx





[Prenavite Tablet]     


 


Prenatal Vit-Fe Fumar-FA [Prenatal 1 tab PO QDAY 19 

23:00 History





Vitamin]     











Active Meds: 


Active Medications





Acetaminophen (Tylenol)  650 mg PO Q4H PRN


   PRN Reason: Fever >100.5/HA


Acetaminophen/Hydrocodone Bitart (Norco 5/325)  1 each PO Q4H PRN


   PRN Reason: Pain, Moderate (4-6)


Diphenhydramine HCl (Benadryl)  25 mg IV Q6H PRN


   PRN Reason: Itching


Ferrous Sulfate (Feosol)  325 mg PO QDAY ALECIA


Dextrose/Lactated Ringer's (D5lr)  1,000 mls @ 125 mls/hr IV AS DIRECT ALECIA


   Last Admin: 19 01:43 Dose:  125 mls/hr


   Documented by: 


Oxytocin/Sodium Chloride (Pitocin/Ns 20 Unit/1000ml Drip)  20 units in 1,000 mls

@ 250 mls/hr IV AS DIRECT ALECIA


Ibuprofen (Motrin)  800 mg PO Q6H PRN


   PRN Reason: Pain, Mild (1-3)


Ketorolac Tromethamine (Toradol)  30 mg IV Q6H PRN


   PRN Reason: Pain, Moderate (4-6)


   Stop: 19 15:29


   Last Admin: 19 01:43 Dose:  30 mg


   Documented by: 


Magnesium Hydroxide (Milk Of Magnesia)  30 ml PO QHS PRN


   PRN Reason: Constip Unrelieved By Senna


Measles/Mumps/Rubella Vaccine Live (M-M-R Ii Vaccine)  0.5 ml SUB-Q .ONCE ONE


   Stop: 19 11:01


Multi-Ingredient Ointment (Lansinoh)  1 applic TP PRN PRN


   PRN Reason: dryness/cracking


Multivitamins/Iron/Calcium (Prenatal Vitamin)  1 each PO QDAY ALECIA


Naloxone HCl (Narcan 0.4 Mg/1 Ml)  0.1 mg IV Q2MIN PRN


   PRN Reason: Res Rate </= 8 or 02 SAT < 92%


Ondansetron HCl (Zofran)  4 mg IV Q8H PRN


   PRN Reason: Nausea And Vomiting


Oxycodone/Acetaminophen (Percocet 5/325)  2 tab PO Q6H PRN


   PRN Reason: Pain, Moderate (4-6)


Promethazine HCl (Phenergan)  25 mg PO Q6H PRN


   PRN Reason: Nausea And Vomiting


Promethazine HCl (Phenergan)  25 mg UT Q6H PRN


   PRN Reason: Nausea And Vomiting


Senna (Senokot)  17.2 mg PO QHS PRN


   PRN Reason: Constipation


Simethicone (Mylicon)  80 mg PO Q6H PRN


   PRN Reason: Gas pain


Sodium Chloride (Sodium Chloride Flush Syringe 10 Ml)  10 ml IV PRN NR


   Stop: 19 14:59


Sodium Chloride (Sodium Chloride Flush Syringe 10 Ml)  10 ml IV PRN PRN


   PRN Reason: flush


Witch Hazel/Glycerin (Tucks Pad)  1 each TP PRN PRN


   PRN Reason: Hemorrhoids/cleansing/soothing











Review of Systems


All systems: negative





Exam





- Constitutional


Vitals: 


                                        











Temp Pulse Resp BP Pulse Ox


 


 98.7 F   104 H  20   142/96   97 


 


 19 00:39  19 00:39  19 02:13  19 00:39  19 00:39











General appearance: Present: no acute distress, well-nourished





- EENT


Eyes: Present: PERRL


ENT: hearing intact, clear oral mucosa





- Neck


Neck: Present: supple, normal ROM





- Respiratory


Respiratory effort: normal


Respiratory: bilateral: CTA





- Cardiovascular


Heart rate: 78


Rhythm: regular


Heart Sounds: Present: S1 & S2.  Absent: rub, click





- Extremities


Extremities: no ischemia, pulses intact, pulses symmetrical


Extremity abnormal: edema (2 plus lower extremities)


Peripheral Pulses: within normal limits





- Abdominal


General gastrointestinal: Present: soft, non-tender, non-distended, normal bowel

sounds


Female genitourinary: Present: normal





- Integumentary


Integumentary: Present: clear, warm, dry





- Musculoskeletal


Musculoskeletal: gait normal, strength equal bilaterally





- Psychiatric


Psychiatric: appropriate mood/affect, intact judgment & insight





- Neurologic


Neurologic: CNII-XII intact, moves all extremities





Results





- Labs


CBC & Chem 7: 


                                 19 01:40





Labs: 


                              Abnormal lab results











  19 Range/Units





  11:30 01:40 


 


Hgb   9.9 L  (10.1-14.3)  gm/dl


 


MCV  77 L   (79-97)  fl


 


MCH  24 L   (28-32)  pg


 


RDW  16.9 H   (13.2-15.2)  %


 


Mono % (Auto)  11.5 H   (0.0-7.3)  %














Assessment and Plan





- Patient Problems


(1) CHF (congestive heart failure)


Onset Date: 19   Current Visit: Yes   Status: Chronic   


Qualifiers: 


   Heart failure chronicity: chronic 


Plan to address problem: 


By History


Only One dose of Lasix for Pedal edema


ECHO for EF


Patient not on diuretics for 2 years











(2) HTN (hypertension)


Current Visit: Yes   Status: Chronic   


Qualifiers: 


   Hypertension type: essential hypertension   Qualified Code(s): I10 - 

Essential (primary) hypertension   


Plan to address problem: 


Cont Metoprolol








(3) Anemia


Current Visit: Yes   Status: Chronic   


Qualifiers: 


   Anemia type: iron deficiency 


Plan to address problem: 


Cont Ferrous sulfate








(4) DVT prophylaxis


Current Visit: Yes   Status: Acute   





(5) DVT prophylaxis


Current Visit: Yes   Status: Acute   


Plan to address problem: 


On SCD's

## 2019-02-01 NOTE — PROGRESS NOTE
Assessment and Plan





- Patient Problems


(1) 38 weeks gestation of pregnancy


Onset Date: 19   Current Visit: Yes   Status: Resolved   





(2) CHF (congestive heart failure)


Onset Date: 19   Current Visit: Yes   Status: Chronic   


Qualifiers: 


   Heart failure chronicity: chronic 





(3) Chronic hypertension


Onset Date: 19   Current Visit: Yes   Status: Chronic   





(4) Uterine fibroid in pregnancy


Onset Date: 19   Current Visit: Yes   Status: Chronic   





(5) Status post 


Onset Date: 19   Current Visit: Yes   Status: Resolved   


Plan to address problem: 


A:  S/P Repeat C Section - POD #1  Doing well


      Asymptomatic anemia - stable


      CHF - doing well





 P:  Continue RPOC


      Appreciate Hospitalist input


      EKG pending


      Anticipate discharge in 24-48hrs








Subjective





- Subjective


Date of service: 19


Principal diagnosis: s/p C Section - POD #1


Interval history: 





Pt is feeling well without complaints.  She is tolerating a reg diet without 

nausea or vomiting, ambulating and voiding without difficulty.


Patient reports: appetite normal, voiding normally, pain well controlled, 

flatus, ambulating normally, no dizzy ambulation, no nauseated


: doing well, bottle feeding





Objective





- Vital Signs


Latest vital signs: 


                                   Vital Signs











  Temp Pulse Resp BP Pulse Ox


 


 19 11:16   106 H   141/71 


 


 19 08:24  99.2 F  106 H  20  141/71  97


 


 19 05:24  98.3 F  100 H  30 H  140/92  98


 


 19 02:13    20  


 


 19 01:43    18  


 


 19 00:39  98.7 F  104 H  22  142/96  97


 


 19 21:21  99.1 F  95 H  20  150/73  97


 


 19 20:08    18  


 


 19 19:38    18  


 


 19 15:33   78  18  138/88  98


 


 19 15:24    18  


 


 19 15:20    18  


 


 19 15:18    18  


 


 19 15:10   84  18  140/73  98


 


 19 15:09    18  


 


 19 15:00    18  


 


 19 14:55  98.3 F  74  18  118/88  98


 


 19 14:50    18  


 


 19 14:48    18  


 


 19 14:40   77  18  122/59  98


 


 19 14:25   82  18  136/73  98


 


 19 14:20   80  18  103/42  98


 


 19 14:15   78  20  129/62  98


 


 19 14:10  97.9 F  80  20  122/59  98


 


 19 12:21   96 H   157/90 








                                Intake and Output











 19





 22:59 06:59 14:59


 


Intake Total 400 410 


 


Output Total 1000 1800 300


 


Balance -600 -1390 -300


 


Intake:   


 


   50 


 


    CLEOCIN 600 MG/50 mL 600  50 





    mg In 50 ml @ 100 mls/hr   





    IV Q8H [facility] Rx#:[id number]   


 


  Oral  240 


 


  Intake, Free Water  120 


 


Output:   


 


  Urine 1000 1800 300


 


    Indwelling Catheter  1800 


 


    Void   300


 


Other:   


 


  Total, Intake Amount  240 


 


  Total, Output Amount  1000 300


 


  # Voids   


 


    Void   1














- Exam


Breasts: Present: deferred


Abdomen: Present: normal appearance, soft


Uterus: Present: normal, firm, fundal height below umbilicus


Extremities: Present: normal


Incision: Present: normal, dry, intact, dressed





- Labs


Labs: 


                              Abnormal lab results











  19 Range/Units





  01:40 


 


Hgb  9.9 L  (10.1-14.3)  gm/dl








                                Laboratory Tests











  19





  11:30 11:30 01:40


 


WBC  7.3  


 


RBC  4.58  


 


Hgb  10.9   9.9 L


 


Hct  35.0   31.2


 


MCV  77 L  


 


MCH  24 L  


 


MCHC  31  


 


RDW  16.9 H  


 


Plt Count  265  


 


Lymph % (Auto)  20.4  


 


Mono % (Auto)  11.5 H  


 


Eos % (Auto)  0.8  


 


Baso % (Auto)  0.3  


 


Lymph #  1.5  


 


Mono #  0.8  


 


Eos #  0.1  


 


Baso #  0.0  


 


Seg Neutrophils %  67.0  


 


Seg Neutrophils #  4.9  


 


Hep Bs Antigen   


 


Blood Type   O POSITIVE 


 


Antibody Screen   Negative 














  19





  11:20


 


WBC 


 


RBC 


 


Hgb 


 


Hct 


 


MCV 


 


MCH 


 


MCHC 


 


RDW 


 


Plt Count 


 


Lymph % (Auto) 


 


Mono % (Auto) 


 


Eos % (Auto) 


 


Baso % (Auto) 


 


Lymph # 


 


Mono # 


 


Eos # 


 


Baso # 


 


Seg Neutrophils % 


 


Seg Neutrophils # 


 


Hep Bs Antigen  Non-reactive


 


Blood Type 


 


Antibody Screen

## 2019-02-02 LAB
ALBUMIN SERPL-MCNC: 3.1 G/DL (ref 3.9–5)
ALT SERPL-CCNC: 10 UNITS/L (ref 7–56)
BUN SERPL-MCNC: 11 MG/DL (ref 7–17)
BUN/CREAT SERPL: 22 %
CALCIUM SERPL-MCNC: 9.2 MG/DL (ref 8.4–10.2)
HEMOLYSIS INDEX: 18

## 2019-02-02 RX ADMIN — POTASSIUM CHLORIDE SCH MEQ: 1500 TABLET, EXTENDED RELEASE ORAL at 10:02

## 2019-02-02 RX ADMIN — IBUPROFEN PRN MG: 800 TABLET, FILM COATED ORAL at 17:58

## 2019-02-02 RX ADMIN — HYDROCODONE BITARTRATE AND ACETAMINOPHEN PRN EACH: 5; 325 TABLET ORAL at 17:56

## 2019-02-02 RX ADMIN — HYDROCODONE BITARTRATE AND ACETAMINOPHEN PRN EACH: 5; 325 TABLET ORAL at 10:01

## 2019-02-02 RX ADMIN — IBUPROFEN PRN MG: 800 TABLET, FILM COATED ORAL at 10:04

## 2019-02-02 RX ADMIN — OXYCODONE AND ACETAMINOPHEN PRN TAB: 5; 325 TABLET ORAL at 23:06

## 2019-02-02 RX ADMIN — Medication SCH EACH: at 10:01

## 2019-02-02 RX ADMIN — METOPROLOL SUCCINATE SCH MG: 25 TABLET, FILM COATED, EXTENDED RELEASE ORAL at 23:04

## 2019-02-02 RX ADMIN — FERROUS SULFATE TAB 325 MG (65 MG ELEMENTAL FE) SCH MG: 325 (65 FE) TAB at 10:00

## 2019-02-02 RX ADMIN — METOPROLOL SUCCINATE SCH MG: 25 TABLET, FILM COATED, EXTENDED RELEASE ORAL at 10:00

## 2019-02-02 NOTE — PROGRESS NOTE
Assessment and Plan





(1) CHF (congestive heart failure)


Onset Date: 01/31/19   Current Visit: Yes   Status: Chronic   


Qualifiers: 


   Heart failure chronicity: chronic 


Plan to address problem: 


By History


s/p One dose of Lasix for Pedal edema


ECHO showed Ef 35-40%


Patient not on diuretics for 2 years


will consult cardiology








(2) HTN (hypertension)


Current Visit: Yes   Status: Chronic   


Qualifiers: 


   Hypertension type: essential hypertension   Qualified Code(s): I10 - 

Essential (primary) hypertension   


Plan to address problem: 


Cont Metoprolol








(3) Anemia


Current Visit: Yes   Status: Chronic   


Qualifiers: 


   Anemia type: iron deficiency 


Plan to address problem: 


Cont Ferrous sulfate








(4) DVT prophylaxis


Current Visit: Yes   Status: Acute    


On SCD's





Subjective


Date of service: 02/02/19


Principal diagnosis: s/p C Section - POD #1


Interval history: 





pt seen and examined


denies any chest pain, c/o SOB on exertion


No LE swelling, tolerating diet














Objective





- Exam


Narrative Exam: 





There is no fever his blood cultures and other


General appearance: Present: no acute distress, well-nourished





- EENT


Eyes: PERRL, EOM intact


ENT: hearing intact, clear oral mucosa


Ears: bilateral: normal





- Neck


Neck: supple, normal ROM





- Respiratory


Respiratory effort: normal


Respiratory: bilateral: CTA





- Cardiovascular


Rhythm: regular


Heart Sounds: Present: S1 & S2.  Absent: gallop, rub


Extremities: pulses intact, No edema, normal color, Full ROM





- Gastrointestinal


General gastrointestinal: Present: soft, non-tender, non-distended, normal bowel

sounds





- Integumentary


Integumentary: clear, warm, dry





- Musculoskeletal


Musculoskeletal: 1, strength equal bilaterally





- Neurologic


Neurologic: moves all extremities





- Psychiatric


Psychiatric: memory intact, appropriate mood/affect, intact judgment & insight








- Constitutional


Vitals: 


                               Vital Signs - 12hr











  02/02/19 02/02/19 02/02/19





  08:07 10:01 10:04


 


Temperature 98.0 F  


 


Pulse Rate 87  


 


Respiratory 20 20 20





Rate   


 


Blood Pressure 150/92  


 


O2 Sat by Pulse 100  





Oximetry   














  02/02/19





  12:22


 


Temperature 99.0 F


 


Pulse Rate 101 H


 


Respiratory 20





Rate 


 


Blood Pressure 147/87


 


O2 Sat by Pulse 98





Oximetry 














- Labs


CBC & Chem 7: 


                                 02/01/19 01:40





                                 02/02/19 15:54

## 2019-02-03 RX ADMIN — METOPROLOL SUCCINATE SCH MG: 25 TABLET, FILM COATED, EXTENDED RELEASE ORAL at 10:13

## 2019-02-03 RX ADMIN — METOPROLOL SUCCINATE SCH MG: 25 TABLET, FILM COATED, EXTENDED RELEASE ORAL at 22:10

## 2019-02-03 RX ADMIN — POTASSIUM CHLORIDE SCH MEQ: 1500 TABLET, EXTENDED RELEASE ORAL at 10:13

## 2019-02-03 RX ADMIN — IBUPROFEN PRN MG: 800 TABLET, FILM COATED ORAL at 06:59

## 2019-02-03 RX ADMIN — IBUPROFEN PRN MG: 800 TABLET, FILM COATED ORAL at 17:49

## 2019-02-03 RX ADMIN — IBUPROFEN PRN MG: 800 TABLET, FILM COATED ORAL at 00:25

## 2019-02-03 RX ADMIN — Medication SCH EACH: at 10:13

## 2019-02-03 RX ADMIN — FUROSEMIDE SCH MG: 40 TABLET ORAL at 17:50

## 2019-02-03 RX ADMIN — SIMETHICONE PRN MG: 80 TABLET, CHEWABLE ORAL at 00:25

## 2019-02-03 RX ADMIN — FERROUS SULFATE TAB 325 MG (65 MG ELEMENTAL FE) SCH MG: 325 (65 FE) TAB at 10:13

## 2019-02-03 RX ADMIN — OXYCODONE AND ACETAMINOPHEN PRN TAB: 5; 325 TABLET ORAL at 22:12

## 2019-02-03 RX ADMIN — OXYCODONE AND ACETAMINOPHEN PRN TAB: 5; 325 TABLET ORAL at 04:56

## 2019-02-03 NOTE — PROGRESS NOTE
Assessment and Plan





(1) CHF (congestive heart failure)


Onset Date: 01/31/19   Current Visit: Yes   Status: Chronic   


Qualifiers: 


   Heart failure chronicity: chronic 


Plan to address problem: 


By History


s/p One dose of Lasix iv for Pedal edema


will start on lasix scheduled dose


hols aspirin as she is POd #3


ECHO showed Ef 35-40%


Patient not on diuretics for 2 years


Consulted cardiology, need close outpt followup








(2) HTN (hypertension)


Current Visit: Yes   Status: Chronic   


Qualifiers: 


   Hypertension type: essential hypertension   Qualified Code(s): I10 - 

Essential (primary) hypertension   


Plan to address problem: 


Cont Metoprolol








(3) Anemia


Current Visit: Yes   Status: Chronic   


Qualifiers: 


   Anemia type: iron deficiency 


Plan to address problem: 


Cont Ferrous sulfate








(4) DVT prophylaxis


Current Visit: Yes   Status: Acute    


On SCD's





Subjective


Date of service: 02/03/19


Principal diagnosis: s/p C Section - POD #1


Interval history: 





pt seen and examined


denies any chest pain, c/o SOB on exertion


No LE swelling, tolerating diet














Objective





- Exam


Narrative Exam: 





There is no fever his blood cultures and other


General appearance: Present: no acute distress, well-nourished





- EENT


Eyes: PERRL, EOM intact


ENT: hearing intact, clear oral mucosa


Ears: bilateral: normal





- Neck


Neck: supple, normal ROM





- Respiratory


Respiratory effort: normal


Respiratory: bilateral: CTA





- Cardiovascular


Rhythm: regular


Heart Sounds: Present: S1 & S2.  Absent: gallop, rub


Extremities: pulses intact, No edema, normal color, Full ROM





- Gastrointestinal


General gastrointestinal: Present: soft, non-tender, non-distended, normal bowel

sounds





- Integumentary


Integumentary: clear, warm, dry





- Musculoskeletal


Musculoskeletal: 1, strength equal bilaterally





- Neurologic


Neurologic: moves all extremities





- Psychiatric


Psychiatric: memory intact, appropriate mood/affect, intact judgment & insight








- Constitutional


Vitals: 


                               Vital Signs - 12hr











  02/03/19 02/03/19 02/03/19





  00:06 00:25 00:30


 


Temperature   98.4 F


 


Pulse Rate   70


 


Respiratory 20 20 18





Rate   


 


Blood Pressure   130/72





[Left]   


 


Blood Pressure   





[Right]   


 


O2 Sat by Pulse   





Oximetry   














  02/03/19 02/03/19 02/03/19





  04:00 04:56 07:57


 


Temperature 98.7 F  98.1 F


 


Pulse Rate 80  75


 


Respiratory 16 20 20





Rate   


 


Blood Pressure 122/78  





[Left]   


 


Blood Pressure   133/62





[Right]   


 


O2 Sat by Pulse   100





Oximetry   














- Labs


CBC & Chem 7: 


                                 02/01/19 01:40





                                 02/02/19 15:54


Labs: 


                              Abnormal lab results











  02/02/19 Range/Units





  15:54 


 


Creatinine  0.5 L  (0.7-1.2)  mg/dL


 


Alkaline Phosphatase  137 H  ()  units/L


 


Total Protein  6.0 L  (6.3-8.2)  g/dL


 


Albumin  3.1 L  (3.9-5)  g/dL

## 2019-02-03 NOTE — PROGRESS NOTE
Assessment and Plan


A: Postpartum/postop day 3 S/P repeat LTCS. 


Chronic hypertension. CHF.


P: Continue current management. Pt. to be seen by cardiology. 











Subjective





- Subjective


Date of service: 19


Principal diagnosis: s/p C Section - POD #3


Interval history: 


Postpartum/postop day 3 S/P repeat LTCS.


Patient being seen by hospitalist and cardiology consult pending. 


Patient reports she has had mild SOB. She denies chest pain. 


She is voiding without difficulty and ambulating well. Passing gas. Tolerating a

regular diet. 





Patient reports: appetite normal, voiding normally, pain well controlled, 

flatus, ambulating normally, no dizzy ambulation, no nauseated


[place]: doing well





Objective





- Vital Signs


Latest vital signs: 


                                   Vital Signs











  Temp Pulse Resp BP BP BP Pulse Ox


 


 19 10:13   88   141/62   


 


 19 07:57  98.1 F  75  20    133/62  100


 


 19 04:56    20    


 


 19 04:00  98.7 F  80  16   122/78  


 


 19 00:30  98.4 F  70  18   130/72  


 


 19 00:25    20    


 


 19 00:06    20    


 


 19 23:06    22    


 


 19 23:04   90   140/85   


 


 19 19:30  98.7 F  75  18   133/72  


 


 19 17:58    20    


 


 19 17:56    20    


 


 19 16:07  97.9 F  98 H  24  135/71    100








                                Intake and Output











 19





 23:59 07:59 15:59


 


Intake Total 240  240


 


Balance 240  240


 


Intake:   


 


  Oral 240  240


 


Other:   


 


  Total, Intake Amount 240  240


 


  # Voids   


 


    Indwelling Catheter 1  


 


    Void 1 1 1














- Exam


Cardiovascular: Present: Regular rate, Normal S1, Normal S2


Lungs: Present: Clear to auscultation


Abdomen: Present: normal appearance, soft, normal bowel sounds.  Absent: 

distention, tenderness, guarding, rigidity


Uterus: Present: normal, firm, fundal height below umbilicus.  Absent: 

bogginess, tenderness


Extremities: Present: normal, edema (mild pedal edema bilaterally).  Absent: 

tenderness


Incision: Present: normal, dry, intact





- Labs


Labs: 


                              Abnormal lab results











  19 Range/Units





  15:54 


 


Creatinine  0.5 L  (0.7-1.2)  mg/dL


 


Alkaline Phosphatase  137 H  ()  units/L


 


Total Protein  6.0 L  (6.3-8.2)  g/dL


 


Albumin  3.1 L  (3.9-5)  g/dL

## 2019-02-04 RX ADMIN — OXYCODONE AND ACETAMINOPHEN PRN TAB: 5; 325 TABLET ORAL at 11:09

## 2019-02-04 RX ADMIN — FUROSEMIDE SCH MG: 40 TABLET ORAL at 18:31

## 2019-02-04 RX ADMIN — SIMETHICONE PRN MG: 80 TABLET, CHEWABLE ORAL at 06:20

## 2019-02-04 RX ADMIN — HYDROCODONE BITARTRATE AND ACETAMINOPHEN PRN EACH: 5; 325 TABLET ORAL at 20:32

## 2019-02-04 RX ADMIN — METOPROLOL SUCCINATE SCH: 25 TABLET, FILM COATED, EXTENDED RELEASE ORAL at 10:00

## 2019-02-04 RX ADMIN — FERROUS SULFATE TAB 325 MG (65 MG ELEMENTAL FE) SCH MG: 325 (65 FE) TAB at 11:09

## 2019-02-04 RX ADMIN — POTASSIUM CHLORIDE SCH MEQ: 1500 TABLET, EXTENDED RELEASE ORAL at 11:03

## 2019-02-04 RX ADMIN — OXYCODONE AND ACETAMINOPHEN PRN TAB: 5; 325 TABLET ORAL at 05:16

## 2019-02-04 RX ADMIN — FUROSEMIDE SCH MG: 40 TABLET ORAL at 06:18

## 2019-02-04 RX ADMIN — Medication SCH EACH: at 11:10

## 2019-02-04 RX ADMIN — IBUPROFEN PRN MG: 800 TABLET, FILM COATED ORAL at 11:10

## 2019-02-04 RX ADMIN — METOPROLOL SUCCINATE SCH MG: 25 TABLET, FILM COATED, EXTENDED RELEASE ORAL at 22:49

## 2019-02-04 NOTE — CONSULTATION
Addendum entered and electronically signed by REGIS HOFFMAN MD  19 18:13:







The patient has a nonischemic cardiomyopathy and requires guideline directed med

ical therapy including an Ace or ARB, beta blockers, and a loop diuretic and 

spironolactone, and low-dose aspirin.





Some or all of these agents are variously excreted in breast milk, we will 

recommend that she avoids breast-feeding while on heart failure therapies, 

unless otherwise advised by her obstetrician and pediatrician.





Patient is otherwise stable for cardiac discharge with outpatient follow-up in 

5-7 days with her primary cardiologist Dr. Norman.





Original Note:








History of Present Illness


Consult date: 19


Consult reason: congestive heart failure


History of present illness: 





Patient is a 33 year old woman whom is status post cesarian section. Patient is 

known to [facility] and sees Dr Norman on a routine basis. She has a history 

of peripartum cardiomyopathy diagnosed during her first pregnancy greater than 5

years ago. She also has chronic hypertension. An echocardiogram this admission 

revealed a decreased left ventricular systolic function, ejection fraction 35-

40% thus this cardiac consultation. 





Patient is resting in bed and appears comfortable. She denies unusual shortness 

of breath, chest pain and palpitations. There is no lower extremity edema. 





Past History


Past Medical History: heart failure, hypertension


Past Surgical History: 


Social history: no significant social history, single, full code


Family history: hypertension





Medications and Allergies


                                    Allergies











Allergy/AdvReac Type Severity Reaction Status Date / Time


 


amoxicillin [Amoxicillin] Allergy  Rash Verified 18 10:16


 


codeine Allergy  Angioedema Verified 18 10:16


 


latex Allergy  Rash Verified 18 10:16











                                Home Medications











 Medication  Instructions  Recorded  Confirmed  Last Taken  Type


 


Metoprolol SUCCINATE ER TAB 25 mg PO BID 18 09:00 

History





    1 


 


Ferrous Sulfate [Feosol 325 MG tab] 325 mg PO BID #60 tablet 19  Unknown 

Rx


 


HYDROcodone/APAP 5-325 [[place] 1 each PO Q6HR PRN #30 tablet 19  Unknown Rx





5/325]     


 


Ibuprofen [Motrin] 800 mg PO Q8HR PRN #30 tablet 19  Unknown Rx


 


Pnv No.95/Ferrous Fum/Folic AC 1 each PO DAILY #30 tablet 19  Unknown Rx





[Prenavite Tablet]     


 


Prenatal Vit-Fe Fumar-FA [Prenatal 1 tab PO QDAY 19 

23:00 History





Vitamin]     











Active Meds: 


Active Medications





Acetaminophen (Tylenol)  650 mg PO Q4H PRN


   PRN Reason: Fever >100.5/HA


Acetaminophen/Hydrocodone Bitart (Norco 5/325)  1 each PO Q4H PRN


   PRN Reason: Pain, Moderate (4-6)


   Last Admin: 19 17:56 Dose:  1 each


   Documented by: 


Diphenhydramine HCl (Benadryl)  25 mg IV Q6H PRN


   PRN Reason: Itching


Ferrous Sulfate (Feosol)  325 mg PO QDAY [facility]


   Last Admin: 19 11:09 Dose:  325 mg


   Documented by: 


Furosemide (Lasix)  40 mg PO 0600,1800 [facility]


   Last Admin: 19 06:18 Dose:  40 mg


   Documented by: 


Oxytocin/Sodium Chloride (Pitocin/Ns 20 Unit/1000ml Drip)  20 units in 1,000 mls

@ 250 mls/hr IV AS DIRECT [facility]


Ibuprofen (Motrin)  800 mg PO Q6H PRN


   PRN Reason: Pain, Mild (1-3)


   Last Admin: 19 11:10 Dose:  800 mg


   Documented by: 


Ketorolac Tromethamine (Toradol)  30 mg IV Q6H PRN


   PRN Reason: Pain, Moderate (4-6)


   Stop: 19 15:29


   Last Admin: 19 01:43 Dose:  30 mg


   Documented by: 


Magnesium Hydroxide (Milk Of Magnesia)  30 ml PO QHS PRN


   PRN Reason: Constip Unrelieved By Senna


Metoprolol Succinate (Toprol Xl)  25 mg PO BID [facility]


   Last Admin: 19 22:10 Dose:  25 mg


   Documented by: 


Multi-Ingredient Ointment (Lansinoh)  1 applic TP PRN PRN


   PRN Reason: dryness/cracking


Multivitamins/Iron/Calcium (Prenatal Vitamin)  1 each PO QDAY [facility]


   Last Admin: 19 11:10 Dose:  1 each


   Documented by: 


Naloxone HCl (Narcan 0.4 Mg/1 Ml)  0.1 mg IV Q2MIN PRN


   PRN Reason: Res Rate </= 8 or 02 SAT < 92%


Ondansetron HCl (Zofran)  4 mg IV Q8H PRN


   PRN Reason: Nausea And Vomiting


Oxycodone/Acetaminophen (Percocet 5/325)  2 tab PO Q6H PRN


   PRN Reason: Pain, Moderate (4-6)


   Last Admin: 19 11:09 Dose:  2 tab


   Documented by: 


Potassium Chloride (K-Dur)  20 meq PO QDAY [facility]


   Last Admin: 19 11:03 Dose:  20 meq


   Documented by: 


Promethazine HCl (Phenergan)  25 mg PO Q6H PRN


   PRN Reason: Nausea And Vomiting


Promethazine HCl (Phenergan)  25 mg NH Q6H PRN


   PRN Reason: Nausea And Vomiting


Senna (Senokot)  17.2 mg PO QHS PRN


   PRN Reason: Constipation


Simethicone (Mylicon)  80 mg PO Q6H PRN


   PRN Reason: Gas pain


   Last Admin: 19 06:20 Dose:  80 mg


   Documented by: 


Sodium Chloride (Sodium Chloride Flush Syringe 10 Ml)  10 ml IV PRN PRN


   PRN Reason: flush


Witch Hazel/Glycerin (Tucks Pad)  1 each TP PRN PRN


   PRN Reason: Hemorrhoids/cleansing/soothing











Physical Examination


                                   Vital Signs











Pulse BP


 


 100 H  152/88 


 


 19 11:12  19 11:12











General appearance: no acute distress


HEENT: Positive: PERRL


Neck: Positive: trachea midline


Cardiac: Positive: Reg Rate and Rhythm


Lungs: Positive: Decreased Breath Sounds


Neuro: Positive: Grossly Intact


Extremities: Absent: edema





Results





                                 19 01:40





                                 19 15:54





Assessment and Plan





s/p Cesarian section


Hx of peripartum CMP


Hypertension





Recommendations:


Medical therapy for dilated cardiomyopathy to include diuretics, beta blockers, 

afterload reduction agents and oral antiplatelet therapy.


Patient advised not to breastfeed.


Stable cardiac wise. Once discharged, patient advised to follow up with her 

primary cardiologist within 1-2 weeks.

## 2019-02-04 NOTE — PROGRESS NOTE
Assessment and Plan





(1) CHF (congestive heart failure)


Onset Date: 01/31/19   Current Visit: Yes   Status: Chronic   


Qualifiers: 


   Heart failure chronicity: chronic 


Plan to address problem: 


s/p One dose of Lasix iv for Pedal edema


Now on lasix scheduled dose


ECHO showed Ef 35-40%


Consulted cardiology, need close outpt followup


started on aspirin, BB, ACEI, aldactone by cardiology


Recommended abstinence from breast feeding








(2) HTN (hypertension)


Current Visit: Yes   Status: Chronic   


Qualifiers: 


   Hypertension type: essential hypertension   Qualified Code(s): I10 - 

Essential (primary) hypertension   


Plan to address problem: 


Cont Metoprolol, ACEI








(3) Anemia


Current Visit: Yes   Status: Chronic   


Qualifiers: 


   Anemia type: iron deficiency 


Plan to address problem: 


Cont Ferrous sulfate








(4) DVT prophylaxis


Current Visit: Yes   Status: Acute    


On SCD's











Subjective


Date of service: 02/04/19


Principal diagnosis: POD #4; s/p Repeat LTCS; Cardiomyopathy, h/o CHF, CHTN


Interval history: 





pt seen and examined


denies any chest pain, c/o SOB on exertion


No LE swelling, tolerating diet


upset that she won't be able to breast feed, counseled at bedside with family














Objective





- Exam


Narrative Exam: 





There is no fever his blood cultures and other


General appearance: Present: no acute distress, well-nourished





- EENT


Eyes: PERRL, EOM intact


ENT: hearing intact, clear oral mucosa


Ears: bilateral: normal





- Neck


Neck: supple, normal ROM





- Respiratory


Respiratory effort: normal


Respiratory: bilateral: CTA





- Cardiovascular


Rhythm: regular


Heart Sounds: Present: S1 & S2.  Absent: gallop, rub


Extremities: pulses intact, No edema, normal color, Full ROM





- Gastrointestinal


General gastrointestinal: Present: soft, non-tender, non-distended, normal bowel

sounds





- Integumentary


Integumentary: clear, warm, dry





- Musculoskeletal


Musculoskeletal: 1, strength equal bilaterally





- Neurologic


Neurologic: moves all extremities





- Psychiatric


Psychiatric: memory intact, appropriate mood/affect, intact judgment & insight








- Constitutional


Vitals: 


                               Vital Signs - 12hr











  02/04/19 02/04/19 02/04/19





  04:50 05:08 05:16


 


Temperature 98.2 F  


 


Pulse Rate 92 H  


 


Respiratory 20  18





Rate   


 


Blood Pressure  147/88 


 


Blood Pressure 147/88  





[Right]   














  02/04/19 02/04/19 02/04/19





  06:16 07:24 11:09


 


Temperature  98.3 F 


 


Pulse Rate  95 H 


 


Respiratory 18 18 20





Rate   


 


Blood Pressure   


 


Blood Pressure  147/98 





[Right]   














  02/04/19





  11:10


 


Temperature 


 


Pulse Rate 


 


Respiratory 20





Rate 


 


Blood Pressure 


 


Blood Pressure 





[Right] 














- Labs


CBC & Chem 7: 


                                 02/01/19 01:40





                                 02/02/19 15:54

## 2019-02-04 NOTE — PROGRESS NOTE
Assessment and Plan





- Patient Problems


(1) S/P repeat low transverse 


Current Visit: Yes   Status: Acute   


Plan to address problem: 


POD 4 - stable


Continue routine postop orders


Ambulation encouraged, as tolerated


Awaiting clearance from Cardiology for discharge








(2) Cardiomyopathy


Current Visit: Yes   Status: Acute   


Plan to address problem: 


s/p Internal Medicine and Cardiology consult








(3) H/O congestive heart failure


Current Visit: Yes   Status: Acute   


Plan to address problem: 


Continue current management








(4) Chronic hypertension


Onset Date: 19   Current Visit: Yes   Status: Chronic   


Plan to address problem: 


Continue current management








(5) Anemia in puerperium, baby delivered during current episode of care


Current Visit: Yes   Status: Acute   


Plan to address problem: 


Asymptomatic


Continue iron therapy








Subjective





- Subjective


Date of service: 19


Principal diagnosis: POD #4; s/p Repeat LTCS; Cardiomyopathy, h/o CHF, CHTN


Patient reports: appetite normal, voiding normally, pain well controlled, 

flatus, bowel movement, ambulating normally, other (reports occasional SOB when 

lying down but denies headache, visual disturbances or RUQ pain), no dizzy 

ambulation


: doing well, other (breast and bottle feeding)





Objective





- Vital Signs


Latest vital signs: 


                                   Vital Signs











  Temp Pulse Resp BP BP Pulse Ox


 


 19 11:10    20   


 


 19 11:09    20   


 


 19 07:24  98.3 F  95 H  18   147/98 


 


 19 06:16    18   


 


 19 05:16    18   


 


 19 05:08     147/88  


 


 19 04:50  98.2 F  92 H  20   147/88 


 


 19 00:33     125/70  


 


 19 00:00  98.4 F  94 H  20   125/70 


 


 19 23:12    18   


 


 19 22:12    18   


 


 19 22:10   104 H   154/97  


 


 19 20:02   111 H   147/95   98


 


 19 20:00  99.4 F  106 H  20   147/85  98


 


 19 17:49    20   


 


 19 16:08  99.4 F  104 H  20  153/84   100








                                Intake and Output











 19





 23:59 07:59 15:59


 


Intake Total 360 840 


 


Balance 360 840 


 


Intake:   


 


  Oral  480 


 


  Intake, Free Water 360 360 


 


Other:   


 


  Total, Intake Amount  480 


 


  # Voids   


 


    Void 1 1 














- Exam


Cardiovascular: Present: Regular rate


Lungs: Present: Clear to auscultation


Abdomen: Present: normal appearance


Vulva: both: normal


Uterus: Present: normal, firm, fundal height below umbilicus


Extremities: Present: normal


Incision: Present: normal, dry, intact


Comments: 





scant lochia

## 2019-02-05 RX ADMIN — METOPROLOL SUCCINATE SCH MG: 25 TABLET, FILM COATED, EXTENDED RELEASE ORAL at 22:03

## 2019-02-05 RX ADMIN — FUROSEMIDE SCH MG: 40 TABLET ORAL at 06:16

## 2019-02-05 RX ADMIN — Medication SCH EACH: at 10:43

## 2019-02-05 RX ADMIN — METOPROLOL SUCCINATE SCH MG: 25 TABLET, FILM COATED, EXTENDED RELEASE ORAL at 10:50

## 2019-02-05 RX ADMIN — LISINOPRIL SCH MG: 5 TABLET ORAL at 10:44

## 2019-02-05 RX ADMIN — SPIRONOLACTONE SCH MG: 25 TABLET ORAL at 10:43

## 2019-02-05 RX ADMIN — HYDROCODONE BITARTRATE AND ACETAMINOPHEN PRN EACH: 5; 325 TABLET ORAL at 06:15

## 2019-02-05 RX ADMIN — FERROUS SULFATE TAB 325 MG (65 MG ELEMENTAL FE) SCH MG: 325 (65 FE) TAB at 10:43

## 2019-02-05 RX ADMIN — ASPIRIN SCH MG: 81 TABLET, CHEWABLE ORAL at 10:47

## 2019-02-05 RX ADMIN — IBUPROFEN PRN MG: 800 TABLET, FILM COATED ORAL at 18:19

## 2019-02-05 NOTE — PROGRESS NOTE
Assessment and Plan





- Patient Problems


(1) Cardiomyopathy


Current Visit: Yes   Status: Acute   


Plan to address problem: 





Started on new cardiac medications today.  Therefore will monitor response to 

medicine for 24-48 hours before discharge.


Lisinopril 5mg PO daily


Metoprolol 25mg PO BID (previously taking)


Lasix 40mg PO daily 


Spirinolactone 250mg PO daily 


ASA 81mg PO daily 











Subjective





- Subjective


Principal diagnosis: POD #4; s/p Repeat LTCS; Cardiomyopathy, h/o CHF, CHTN





Objective





- Vital Signs


Latest vital signs: 


                                   Vital Signs











  Temp Pulse Resp BP BP Pulse Ox


 


 02/05/19 08:06  98.5 F  98 H  20  152/95   100


 


 02/05/19 02:00  99.6 F  108 H  20  150/104   100


 


 02/04/19 22:49   106 H   126/75  


 


 02/04/19 16:27  97.5 F L  92 H  18   122/68 


 


 02/04/19 11:10    20   


 


 02/04/19 11:09    20   








                                Intake and Output











 02/04/19 02/05/19 02/05/19





 23:59 07:59 15:59


 


Intake Total 480  


 


Balance 480  


 


Intake:   


 


  Oral 480  


 


Other:   


 


  Total, Intake Amount 480

## 2019-02-05 NOTE — EVENT NOTE
Date: 02/05/19





The patient has a nonischemic cardiomyopathy and requires guideline directed 

medical therapy including an Ace or ARB, beta blockers, and a loop diuretic and 

spironolactone, and low-dose aspirin.





Some or all of these agents are variously excreted in breast milk, we will 

recommend that she avoids breast-feeding while on heart failure therapies, 

unless otherwise advised by her obstetrician and pediatrician.





Patient is otherwise stable for cardiac discharge with outpatient follow-up in 

5-7 days with her primary cardiologist Dr. Norman.

## 2019-02-05 NOTE — PROGRESS NOTE
Assessment and Plan





(1) CHF (congestive heart failure)


Onset Date: 01/31/19   Current Visit: Yes   Status: Chronic   


Qualifiers: 


   Heart failure chronicity: chronic 


Plan to address problem: 


s/p One dose of Lasix iv for Pedal edema


Now on lasix scheduled dose


ECHO showed Ef 35-40%


Consulted cardiology, need close outpt followup


started on aspirin, BB, ACEI, aldactone by cardiology


Recommended abstinence from breast feeding


Patient could be discharge with outpt cardiology follow up








(2) HTN (hypertension)


Current Visit: Yes   Status: Chronic   


Qualifiers: 


   Hypertension type: essential hypertension   Qualified Code(s): I10 - 

Essential (primary) hypertension   


Plan to address problem: 


Cont Metoprolol, ACEI








(3) Anemia


Current Visit: Yes   Status: Chronic   


Qualifiers: 


   Anemia type: iron deficiency 


Plan to address problem: 


Cont Ferrous sulfate








(4) DVT prophylaxis


Current Visit: Yes   Status: Acute    


On SCD's











Subjective


Date of service: 02/05/19


Principal diagnosis: POD #4; s/p Repeat LTCS; Cardiomyopathy, h/o CHF, CHTN


Interval history: 





pt seen and examined


denies any chest pain, denies


No LE swelling, tolerating diet


Recommended against breast feed











Objective





- Exam


Narrative Exam: 





There is no fever his blood cultures and other


General appearance: Present: no acute distress, well-nourished





- EENT


Eyes: PERRL, EOM intact


ENT: hearing intact, clear oral mucosa


Ears: bilateral: normal





- Neck


Neck: supple, normal ROM





- Respiratory


Respiratory effort: normal


Respiratory: bilateral: CTA





- Cardiovascular


Rhythm: regular


Heart Sounds: Present: S1 & S2.  Absent: gallop, rub


Extremities: pulses intact, No edema, normal color, Full ROM





- Gastrointestinal


General gastrointestinal: Present: soft, non-tender, non-distended, normal bowel

sounds





- Integumentary


Integumentary: clear, warm, dry





- Musculoskeletal


Musculoskeletal: 1, strength equal bilaterally





- Neurologic


Neurologic: moves all extremities





- Psychiatric


Psychiatric: memory intact, appropriate mood/affect, intact judgment & insight








- Constitutional


Vitals: 


                               Vital Signs - 12hr











  02/05/19 02/05/19 02/05/19





  08:06 10:43 10:44


 


Temperature 98.5 F  


 


Pulse Rate 98 H 111 H 111 H


 


Respiratory 20  





Rate   


 


Blood Pressure 152/95 155/108 155/108


 


O2 Sat by Pulse 100  





Oximetry   














  02/05/19 02/05/19





  10:50 12:28


 


Temperature  98.2 F


 


Pulse Rate 111 H 99 H


 


Respiratory  20





Rate  


 


Blood Pressure 155/108 147/96


 


O2 Sat by Pulse  96





Oximetry  














- Labs


CBC & Chem 7: 


                                 02/01/19 01:40





                                 02/02/19 15:54

## 2019-02-06 VITALS — SYSTOLIC BLOOD PRESSURE: 128 MMHG | DIASTOLIC BLOOD PRESSURE: 69 MMHG

## 2019-02-06 RX ADMIN — Medication SCH EACH: at 10:24

## 2019-02-06 RX ADMIN — IBUPROFEN PRN MG: 800 TABLET, FILM COATED ORAL at 06:03

## 2019-02-06 RX ADMIN — ASPIRIN SCH MG: 81 TABLET, CHEWABLE ORAL at 10:26

## 2019-02-06 RX ADMIN — LISINOPRIL SCH MG: 5 TABLET ORAL at 10:25

## 2019-02-06 RX ADMIN — SPIRONOLACTONE SCH MG: 25 TABLET ORAL at 10:26

## 2019-02-06 RX ADMIN — FERROUS SULFATE TAB 325 MG (65 MG ELEMENTAL FE) SCH MG: 325 (65 FE) TAB at 10:24

## 2019-02-06 RX ADMIN — FUROSEMIDE SCH MG: 40 TABLET ORAL at 06:03

## 2019-02-06 RX ADMIN — METOPROLOL SUCCINATE SCH MG: 25 TABLET, FILM COATED, EXTENDED RELEASE ORAL at 10:25

## 2019-02-06 RX ADMIN — IBUPROFEN PRN MG: 800 TABLET, FILM COATED ORAL at 00:15

## 2019-02-06 NOTE — DISCHARGE SUMMARY
Providers





- Providers


Date of Admission: 


19 09:57





Date of discharge: 19


Attending physician: 


GOLRIA BECERRA





                                        





19 14:32


Consult to Physician [CONS] Urgent 


   Comment: 


   Consulting Provider: NIMA RICHARDS


   Physician Instructions: 


   Reason For Exam: S/P C Section; history of CHF





19 12:10


Consult to Physician [CONS] Routine 


   Comment: 


   Consulting Provider: REGIS HOFFMAN


   Physician Instructions: 


   Reason For Exam: chf





19 11:13


Consult to Physician [CONS] Routine 


   Comment: 


   Consulting Provider: REGIS HOFFMAN


   Physician Instructions: 


   Reason For Exam: chf











Primary care physician: 


GLORIA BECERRA








Hospitalization


Reason for admission:  section, other (CHF, CHTN)


Delivery: 


Postpartum complications: other (SOB)


Discharge diagnosis: IUP at term delivered


[place] baby: female


Hospital course: 





Patient is a 33 eyar old  who was admitted on 19 at 38 weeks for 

repeat C/section due to CHF. Her postpartum course was significant for SOB and 

edema on postop day#2. Cardiology consult was done. She had echo which showed EF

 of 35-40%. She was started on lasix, aldactone, toprol, losinopril. her 

symptoms resolved. She has been ambulating without any respiratory complaint. 

She is tolerating regular diet well. [facility] cleared her for discharge home 

today. She hsa appointment to follow up with Dr. Norman in one week and with Dr. CHERELLE Becerra for wound check.


Condition at discharge: Stable


Disposition: DC-01 TO HOME OR SELFCARE





- Discharge Diagnoses


(1) 38 weeks gestation of pregnancy


Status: Resolved   





(2) S/P repeat low transverse 


Status: Acute   





(3) Cardiomyopathy


Status: Acute   





(4) H/O congestive heart failure


Status: Acute   





(5) Anemia


Status: Chronic   


Qualifiers: 


   Anemia type: iron deficiency 





(6) Uterine fibroid


Status: Chronic   


Qualifiers: 


   Uterine leiomyoma location: intramural and submucous   Qualified Code(s): 

D25.1 - Intramural leiomyoma of uterus; D25.0 - Submucous leiomyoma of uterus   





Plan





- Discharge Medications


Prescriptions: 


Ferrous Sulfate [Feosol 325 MG tab] 325 mg PO BID #60 tablet


Furosemide [Lasix] 40 mg PO BID #60 tablet


HYDROcodone/APAP 5-325 [[place] 5/325] 1 each PO Q6HR PRN #30 tablet


 PRN Reason: Pain


Ibuprofen [Motrin] 800 mg PO Q8HR PRN #30 tablet


 PRN Reason: Moder Pain Unrelieved By Norco


Ibuprofen 800 mg PO Q8HR #30 tablet


Lisinopril [Prinivil] 5 mg PO QDAY #30 tablet


Metoprolol Succinate [Toprol Xl] 25 mg PO BID #60 tab.er.24h


oxyCODONE /ACETAMINOPHEN [Percocet 5/325] 1 tab PO Q4HR #14 tab


Pnv No.95/Ferrous Fum/Folic AC [Prenavite Tablet] 1 each PO DAILY #30 tablet


Spironolactone 25 mg PO QDAY #30 tablet





- Provider Discharge Summary


Activity: no sex for 6 weeks, no heavy lifting 4 weeks, no strenuous exercise


Diet: routine


Instructions: routine


Additional instructions: 


[]  Smoking cessation referral if applicable(refer to patient education folder f

or contact #)


[]  Refer to [facility] Booklet








Call your doctor immediately for:


* Fever > 100.5


* Heavy vaginal bleeding ( >1 pad per hour)


* Severe persistent headache


* Shortness of breath


* Reddened, hot, painful area to leg or breast


* Drainage or odor from incision.





* Keep incision clean and dry at all times and follow doctor's instructions 

regarding bathing/showering











- Follow up plan


Follow up: 


GLORIA BECERRA MD [Primary Care Provider] - 7 Days


Forms:  [facility] Discharge Summary

## 2019-02-06 NOTE — PROGRESS NOTE
Assessment and Plan





- Patient Problems


(1) 38 weeks gestation of pregnancy


Onset Date: 19   Current Visit: Yes   Status: Resolved   





(2) S/P repeat low transverse 


Current Visit: Yes   Status: Acute   


Plan to address problem: 


Patient to follow with Dr. Aleman on 19 for wound check.








(3) Cardiomyopathy


Current Visit: Yes   Status: Acute   


Plan to address problem: 


Patient is cleared for discharge by cardiologist. She will continue lasix, 

lisinopril, spironolactone, toprol.


She will follow with cardiology Dr. Norman in one week. 








(4) H/O congestive heart failure


Current Visit: Yes   Status: Acute   





(5) Anemia


Current Visit: Yes   Status: Chronic   


Qualifiers: 


   Anemia type: iron deficiency 


Plan to address problem: 


Continue iron.








(6) Uterine fibroid


Current Visit: No   Status: Chronic   


Qualifiers: 


   Uterine leiomyoma location: intramural and submucous   Qualified Code(s): 

D25.1 - Intramural leiomyoma of uterus; D25.0 - Submucous leiomyoma of uterus   





Subjective





- Subjective


Date of service: 19


Principal diagnosis: POD #4; s/p Repeat LTCS; Cardiomyopathy, h/o CHF, CHTN


Interval history: 





Patient is a 33 year old who is S/P C/section. She has a history of postpartum 

cardiomyopathy with her last pregnancy. After her C/section, she developed SOB 

and edema. Cardiology consult was done. Workup with Echo and EKG was done. She 

was started on lasix, spironolactone, toprol, lisinopril. Today, she denies any 

complaint. She is moving her bowel.





Objective





- Vital Signs


Latest vital signs: 


                                   Vital Signs











  Temp Pulse Resp BP Pulse Ox


 


 19 12:55  98.7 F  95 H  24  128/69  97


 


 19 10:26   94 H   142/78 


 


 19 10:25   94 H   142/78 


 


 19 08:11  98.3 F  97 H  20  142/97  100


 


 19 06:05  98.3 F  90  20  138/76  97


 


 19 02:21  98.5 F  88  20  137/77  100


 


 19 22:03     146/90 


 


 19 21:42   96 H   146/90  96


 


 19 18:19    20  


 


 19 16:18  98.1 F  110 H  20  139/87  99








                                Intake and Output











 19





 07:59 15:59 23:59


 


Intake Total 120  


 


Balance 120  


 


Intake:   


 


  Oral 120  


 


Other:   


 


  Total, Intake Amount 120  


 


  # Voids   


 


    Void 1  














- Exam


Cardiovascular: Present: Normal S1, Normal S2


Lungs: Present: Clear to auscultation


Vulva: both: normal


Deep Tendon Reflex Grade: Normal +2

## 2019-02-06 NOTE — PROGRESS NOTE
Assessment and Plan





(1) CHF (congestive heart failure)


Onset Date: 01/31/19   Current Visit: Yes   Status: Chronic   


Qualifiers: 


   Heart failure chronicity: chronic 


Plan to address problem: 


s/p One dose of Lasix iv for Pedal edema


Now on lasix scheduled dose


ECHO showed Ef 35-40%


Consulted cardiology, need close outpt followup


started on aspirin, BB, ACEI, aldactone by cardiology


Recommended abstinence from breast feeding


Patient could be discharge with outpt cardiology follow up








(2) HTN (hypertension)


Current Visit: Yes   Status: Chronic   


Qualifiers: 


   Hypertension type: essential hypertension   Qualified Code(s): I10 - 

Essential (primary) hypertension   


Plan to address problem: 


Cont Metoprolol, ACEI








(3) Anemia


Current Visit: Yes   Status: Chronic   


Qualifiers: 


   Anemia type: iron deficiency 


Plan to address problem: 


Cont Ferrous sulfate








(4) DVT prophylaxis


Current Visit: Yes   Status: Acute    


On SCD's











Subjective


Date of service: 02/06/19


Principal diagnosis: POD #4; s/p Repeat LTCS; Cardiomyopathy, h/o CHF, CHTN


Interval history: 





pt seen and examined


denies any chest pain, denies SOB


No LE swelling, tolerating diet


Recommended against breast feed


Plan for d/c today











Objective





- Constitutional


Vitals: 


                               Vital Signs - 12hr











  02/06/19 02/06/19 02/06/19





  06:05 08:11 10:25


 


Temperature 98.3 F 98.3 F 


 


Pulse Rate 90 97 H 94 H


 


Respiratory 20 20 





Rate   


 


Blood Pressure 138/76 142/97 142/78


 


O2 Sat by Pulse 97 100 





Oximetry   














  02/06/19





  10:26


 


Temperature 


 


Pulse Rate 94 H


 


Respiratory 





Rate 


 


Blood Pressure 142/78


 


O2 Sat by Pulse 





Oximetry 














- Labs


CBC & Chem 7: 


                                 02/01/19 01:40





                                 02/02/19 15:54

## 2023-05-20 NOTE — PROGRESS NOTE
Assessment and Plan


A: Postpartum/postop day 2 S/P repeat LTCS.


P: Continue current management. 








Subjective





- Subjective


Date of service: 19


Principal diagnosis: s/p C Section - POD #2


Patient reports: appetite normal, voiding normally, pain well controlled, 

flatus, ambulating normally, no dizzy ambulation, no nauseated


[place]: doing well





Objective





- Vital Signs


Latest vital signs: 


                                   Vital Signs











  Temp Pulse Resp BP BP Pulse Ox


 


 19 23:06    22   


 


 19 23:04   90   140/85  


 


 19 17:58    20   


 


 19 17:56    20   


 


 19 16:07  97.9 F  98 H  24  135/71   100


 


 19 12:22  99.0 F  101 H  20  147/87   98


 


 19 10:04    20   


 


 19 10:01    20   


 


 19 08:07  98.0 F  87  20  150/92   100


 


 19 00:00  98.7 F  70  16   140/70 


 


 19 23:54    18   








                                Intake and Output











 19





 07:59 15:59 23:59


 


Intake Total 200 600 240


 


Balance 200 600 240


 


Intake:   


 


  Oral 200 600 240


 


Other:   


 


  Total, Intake Amount 200 240 240


 


  # Voids   


 


    Void  1 1














- Exam


Cardiovascular: Present: Regular rate, Normal S1, Normal S2


Lungs: Present: Clear to auscultation


Abdomen: Present: normal appearance, soft, normal bowel sounds.  Absent: 

distention, tenderness, guarding, rigidity


Uterus: Present: normal, firm, fundal height below umbilicus.  Absent: 

bogginess, tenderness


Extremities: Present: normal.  Absent: tenderness, edema


Incision: Present: normal, dry, intact





- Labs


Labs: 


                              Abnormal lab results











  19 Range/Units





  15:54 


 


Creatinine  0.5 L  (0.7-1.2)  mg/dL


 


Alkaline Phosphatase  137 H  ()  units/L


 


Total Protein  6.0 L  (6.3-8.2)  g/dL


 


Albumin  3.1 L  (3.9-5)  g/dL Received in am, in open crib, RA, vitals stable, voiding, stooling, Mom was here in am, POC updated by RN and DR Bernardo Mcmullen instructions started,verbalizes understanding, New ID applied to mom and Baby, taking more volumes by mouth, Enfacare [employer] agata formula, Passed CCHD and ABR